# Patient Record
Sex: MALE | Race: WHITE | Employment: FULL TIME | URBAN - METROPOLITAN AREA
[De-identification: names, ages, dates, MRNs, and addresses within clinical notes are randomized per-mention and may not be internally consistent; named-entity substitution may affect disease eponyms.]

---

## 2017-06-01 ENCOUNTER — IP HISTORICAL/CONVERTED ENCOUNTER (OUTPATIENT)
Dept: OTHER | Age: 22
End: 2017-06-01

## 2017-06-16 ENCOUNTER — IP HISTORICAL/CONVERTED ENCOUNTER (OUTPATIENT)
Dept: OTHER | Age: 22
End: 2017-06-16

## 2017-08-17 ENCOUNTER — ED HISTORICAL/CONVERTED ENCOUNTER (OUTPATIENT)
Dept: OTHER | Age: 22
End: 2017-08-17

## 2017-08-20 ENCOUNTER — HOSPITAL ENCOUNTER (EMERGENCY)
Age: 22
Discharge: PSYCHIATRIC OR SUBSTANCE USE FACILITY | End: 2017-08-21
Attending: EMERGENCY MEDICINE
Payer: COMMERCIAL

## 2017-08-20 DIAGNOSIS — R45.851 SUICIDAL IDEATIONS: ICD-10-CM

## 2017-08-20 DIAGNOSIS — F99 MENTAL HEALTH DISORDER: Primary | ICD-10-CM

## 2017-08-20 LAB
AMPHET UR QL SCN: NEGATIVE
APPEARANCE UR: CLEAR
BACTERIA URNS QL MICRO: NEGATIVE /HPF
BARBITURATES UR QL SCN: NEGATIVE
BENZODIAZ UR QL: POSITIVE
BILIRUB UR QL: NEGATIVE
CANNABINOIDS UR QL SCN: POSITIVE
COCAINE UR QL SCN: NEGATIVE
COLOR UR: ABNORMAL
DRUG SCRN COMMENT,DRGCM: ABNORMAL
EPITH CASTS URNS QL MICRO: ABNORMAL /LPF
GLUCOSE UR STRIP.AUTO-MCNC: NEGATIVE MG/DL
HGB UR QL STRIP: NEGATIVE
HYALINE CASTS URNS QL MICRO: ABNORMAL /LPF (ref 0–5)
KETONES UR QL STRIP.AUTO: NEGATIVE MG/DL
LEUKOCYTE ESTERASE UR QL STRIP.AUTO: NEGATIVE
METHADONE UR QL: NEGATIVE
NITRITE UR QL STRIP.AUTO: NEGATIVE
OPIATES UR QL: NEGATIVE
PCP UR QL: NEGATIVE
PH UR STRIP: 6.5 [PH] (ref 5–8)
PROT UR STRIP-MCNC: NEGATIVE MG/DL
RBC #/AREA URNS HPF: ABNORMAL /HPF (ref 0–5)
SP GR UR REFRACTOMETRY: 1.01 (ref 1–1.03)
UROBILINOGEN UR QL STRIP.AUTO: 2 EU/DL (ref 0.2–1)
WBC URNS QL MICRO: ABNORMAL /HPF (ref 0–4)

## 2017-08-20 PROCEDURE — 90791 PSYCH DIAGNOSTIC EVALUATION: CPT

## 2017-08-20 PROCEDURE — 80307 DRUG TEST PRSMV CHEM ANLYZR: CPT | Performed by: EMERGENCY MEDICINE

## 2017-08-20 PROCEDURE — 99285 EMERGENCY DEPT VISIT HI MDM: CPT

## 2017-08-20 PROCEDURE — 81001 URINALYSIS AUTO W/SCOPE: CPT | Performed by: EMERGENCY MEDICINE
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
## 2017-08-20 NOTE — ED TRIAGE NOTES
TRIAGE NOTE: Pt arrives for auditory hallucinations x 1.5 years. Pt has been diagnosed with schizoaffective disorder in the past and is taking medications but hasn't taken them for 1.5 weeks. Reports intermittent thoughts of self harm.

## 2017-08-21 ENCOUNTER — HOSPITAL ENCOUNTER (INPATIENT)
Age: 22
LOS: 4 days | Discharge: HOME | DRG: 885 | End: 2017-08-25
Attending: PSYCHIATRY & NEUROLOGY | Admitting: PSYCHIATRY & NEUROLOGY
Payer: COMMERCIAL

## 2017-08-21 VITALS
DIASTOLIC BLOOD PRESSURE: 71 MMHG | OXYGEN SATURATION: 98 % | HEART RATE: 68 BPM | RESPIRATION RATE: 20 BRPM | BODY MASS INDEX: 32.98 KG/M2 | HEIGHT: 68 IN | WEIGHT: 217.6 LBS | TEMPERATURE: 97.6 F | SYSTOLIC BLOOD PRESSURE: 113 MMHG

## 2017-08-21 PROBLEM — F17.210 DEPENDENCE ON NICOTINE FROM CIGARETTES: Status: ACTIVE | Noted: 2017-08-21

## 2017-08-21 PROBLEM — F25.9 SCHIZOAFFECTIVE DISORDER (HCC): Status: ACTIVE | Noted: 2017-08-21

## 2017-08-21 PROBLEM — F29 PSYCHOSIS (HCC): Status: ACTIVE | Noted: 2017-08-21

## 2017-08-21 PROBLEM — F19.10 POLYSUBSTANCE ABUSE (HCC): Status: ACTIVE | Noted: 2017-08-21

## 2017-08-21 PROBLEM — F20.9 SCHIZOPHRENIA (HCC): Status: ACTIVE | Noted: 2017-08-21

## 2017-08-21 LAB
ALBUMIN SERPL-MCNC: 3.7 G/DL (ref 3.5–5)
ALBUMIN/GLOB SERPL: 1.2 {RATIO} (ref 1.1–2.2)
ALP SERPL-CCNC: 77 U/L (ref 45–117)
ALT SERPL-CCNC: 24 U/L (ref 12–78)
ANION GAP SERPL CALC-SCNC: 10 MMOL/L (ref 5–15)
APAP SERPL-MCNC: <2 UG/ML (ref 10–30)
AST SERPL-CCNC: 10 U/L (ref 15–37)
BASOPHILS # BLD: 0.1 K/UL (ref 0–0.1)
BASOPHILS NFR BLD: 1 % (ref 0–1)
BILIRUB SERPL-MCNC: 0.5 MG/DL (ref 0.2–1)
BUN SERPL-MCNC: 5 MG/DL (ref 6–20)
BUN/CREAT SERPL: 5 (ref 12–20)
CALCIUM SERPL-MCNC: 8.6 MG/DL (ref 8.5–10.1)
CHLORIDE SERPL-SCNC: 103 MMOL/L (ref 97–108)
CO2 SERPL-SCNC: 28 MMOL/L (ref 21–32)
CREAT SERPL-MCNC: 1.02 MG/DL (ref 0.7–1.3)
EOSINOPHIL # BLD: 0.3 K/UL (ref 0–0.4)
EOSINOPHIL NFR BLD: 5 % (ref 0–7)
ERYTHROCYTE [DISTWIDTH] IN BLOOD BY AUTOMATED COUNT: 16.8 % (ref 11.5–14.5)
ETHANOL SERPL-MCNC: <10 MG/DL
GLOBULIN SER CALC-MCNC: 3.2 G/DL (ref 2–4)
GLUCOSE SERPL-MCNC: 93 MG/DL (ref 65–100)
HCT VFR BLD AUTO: 41.7 % (ref 36.6–50.3)
HGB BLD-MCNC: 13.8 G/DL (ref 12.1–17)
LYMPHOCYTES # BLD: 2.9 K/UL (ref 0.8–3.5)
LYMPHOCYTES NFR BLD: 42 % (ref 12–49)
MCH RBC QN AUTO: 29.7 PG (ref 26–34)
MCHC RBC AUTO-ENTMCNC: 33.1 G/DL (ref 30–36.5)
MCV RBC AUTO: 89.7 FL (ref 80–99)
MONOCYTES # BLD: 0.6 K/UL (ref 0–1)
MONOCYTES NFR BLD: 9 % (ref 5–13)
NEUTS SEG # BLD: 3 K/UL (ref 1.8–8)
NEUTS SEG NFR BLD: 43 % (ref 32–75)
PLATELET # BLD AUTO: 294 K/UL (ref 150–400)
POTASSIUM SERPL-SCNC: 3.5 MMOL/L (ref 3.5–5.1)
PROT SERPL-MCNC: 6.9 G/DL (ref 6.4–8.2)
RBC # BLD AUTO: 4.65 M/UL (ref 4.1–5.7)
SALICYLATES SERPL-MCNC: 2.7 MG/DL (ref 2.8–20)
SODIUM SERPL-SCNC: 141 MMOL/L (ref 136–145)
WBC # BLD AUTO: 6.9 K/UL (ref 4.1–11.1)

## 2017-08-21 PROCEDURE — 80307 DRUG TEST PRSMV CHEM ANLYZR: CPT | Performed by: EMERGENCY MEDICINE

## 2017-08-21 PROCEDURE — 74011250637 HC RX REV CODE- 250/637: Performed by: PSYCHIATRY & NEUROLOGY

## 2017-08-21 PROCEDURE — 80053 COMPREHEN METABOLIC PANEL: CPT | Performed by: EMERGENCY MEDICINE

## 2017-08-21 PROCEDURE — 85025 COMPLETE CBC W/AUTO DIFF WBC: CPT | Performed by: EMERGENCY MEDICINE

## 2017-08-21 PROCEDURE — 65220000003 HC RM SEMIPRIVATE PSYCH

## 2017-08-21 PROCEDURE — 36415 COLL VENOUS BLD VENIPUNCTURE: CPT | Performed by: EMERGENCY MEDICINE

## 2017-08-21 RX ORDER — IBUPROFEN 200 MG
1 TABLET ORAL
Status: DISCONTINUED | OUTPATIENT
Start: 2017-08-21 | End: 2017-08-25 | Stop reason: HOSPADM

## 2017-08-21 RX ORDER — LORAZEPAM 1 MG/1
1 TABLET ORAL
Status: DISCONTINUED | OUTPATIENT
Start: 2017-08-21 | End: 2017-08-22

## 2017-08-21 RX ORDER — LORAZEPAM 2 MG/ML
2 INJECTION INTRAMUSCULAR
Status: DISCONTINUED | OUTPATIENT
Start: 2017-08-21 | End: 2017-08-25 | Stop reason: HOSPADM

## 2017-08-21 RX ORDER — IBUPROFEN 400 MG/1
400 TABLET ORAL
Status: DISCONTINUED | OUTPATIENT
Start: 2017-08-21 | End: 2017-08-25 | Stop reason: HOSPADM

## 2017-08-21 RX ORDER — ZOLPIDEM TARTRATE 10 MG/1
10 TABLET ORAL
Status: DISCONTINUED | OUTPATIENT
Start: 2017-08-21 | End: 2017-08-25 | Stop reason: HOSPADM

## 2017-08-21 RX ORDER — OLANZAPINE 20 MG/1
20 TABLET ORAL
COMMUNITY

## 2017-08-21 RX ORDER — ACETAMINOPHEN 325 MG/1
650 TABLET ORAL
Status: DISCONTINUED | OUTPATIENT
Start: 2017-08-21 | End: 2017-08-25 | Stop reason: HOSPADM

## 2017-08-21 RX ORDER — ADHESIVE BANDAGE
30 BANDAGE TOPICAL DAILY PRN
Status: DISCONTINUED | OUTPATIENT
Start: 2017-08-21 | End: 2017-08-25 | Stop reason: HOSPADM

## 2017-08-21 RX ORDER — BENZTROPINE MESYLATE 1 MG/ML
2 INJECTION INTRAMUSCULAR; INTRAVENOUS
Status: DISCONTINUED | OUTPATIENT
Start: 2017-08-21 | End: 2017-08-25 | Stop reason: HOSPADM

## 2017-08-21 RX ORDER — OLANZAPINE 5 MG/1
5 TABLET ORAL
Status: DISCONTINUED | OUTPATIENT
Start: 2017-08-21 | End: 2017-08-25 | Stop reason: HOSPADM

## 2017-08-21 RX ORDER — BENZTROPINE MESYLATE 2 MG/1
2 TABLET ORAL
Status: DISCONTINUED | OUTPATIENT
Start: 2017-08-21 | End: 2017-08-25 | Stop reason: HOSPADM

## 2017-08-21 RX ADMIN — ZOLPIDEM TARTRATE 10 MG: 10 TABLET, FILM COATED ORAL at 21:22

## 2017-08-21 RX ADMIN — LORAZEPAM 1 MG: 1 TABLET ORAL at 14:12

## 2017-08-21 RX ADMIN — IBUPROFEN 400 MG: 400 TABLET, FILM COATED ORAL at 17:40

## 2017-08-21 RX ADMIN — LORAZEPAM 1 MG: 1 TABLET ORAL at 19:08

## 2017-08-21 NOTE — IP AVS SNAPSHOT
Justa Prince 
 
 
 [address] Patient: Liban Mc MRN: [id number] :1995 You are allergic to the following Allergen Reactions Penicillin G Hives Recent Documentation Height Weight BMI Smoking Status 1.702 m 98.5 kg 34.02 kg/m2 Heavy Tobacco Smoker Emergency Contacts Name Discharge Info Relation Home Work Mobile Agusto Nguyen DISCHARGE CAREGIVER [3] Brother [24]   [phone] About your hospitalization You were admitted on:  2017 You last received care in the:  [facility]. [facility] You were discharged on:  2017 Unit phone number:  [phone] Why you were hospitalized Your primary diagnosis was:  Schizophrenia (Hcc) Your diagnoses also included:  Polysubstance Abuse, Psychosis, Dependence On Nicotine From Cigarettes, Antisocial Personality Disorder Providers Seen During Your Hospitalizations Provider Role Specialty Primary office phone Barby Stokes MD Attending Provider Psychiatry [phone] Your Primary Care Physician (PCP) Primary Care Physician Office Phone Office Fax Aquiles Edmondson [phone] Follow-up Information Follow up With Details Comments Contact Info Adonay Quiroga MD   [address] [address] 
[phone] [facility] on 2017 Please go to [place] at 1:00 PM on  for medication management and therapy appoitnments [facility] [employer] [address], [address] [phone]  
 
  
17 Dr Judd Cooney at 1:50 PM 
Therapy appt with Ada Trinh at 1:15 PM   
  
  
 
  
  
  
Current Discharge Medication List  
  
START taking these medications Dose & Instructions Dispensing Information Comments Morning Noon Evening Bedtime buPROPion  mg XL tablet Commonly known as:  Bronwyn Erm  
   
 Your last dose was: Your next dose is:    
   
   
 Dose:  300 mg Take 1 Tab by mouth every morning for 14 days. Indications: major depressive disorder Quantity:  14 Tab Refills:  0 CONTINUE these medications which have CHANGED Dose & Instructions Dispensing Information Comments Morning Noon Evening Bedtime * ZyPREXA 20 mg tablet Generic drug:  OLANZapine What changed:  Another medication with the same name was added. Make sure you understand how and when to take each. Your last dose was: Your next dose is:    
   
   
 Dose:  20 mg Take 20 mg by mouth nightly. Refills:  0  
     
   
   
   
  
 * OLANZapine 20 mg tablet Commonly known as:  ZyPREXA What changed: You were already taking a medication with the same name, and this prescription was added. Make sure you understand how and when to take each. Your last dose was: Your next dose is:    
   
   
 Dose:  20 mg Take 1 Tab by mouth every evening for 14 days. Indications: SCHIZOPHRENIA Quantity:  14 Tab Refills:  0  
     
   
   
   
  
 * Notice: This list has 2 medication(s) that are the same as other medications prescribed for you. Read the directions carefully, and ask your doctor or other care provider to review them with you. Where to Get Your Medications Information on where to get these meds will be given to you by the nurse or doctor. ! Ask your nurse or doctor about these medications buPROPion  mg XL tablet OLANZapine 20 mg tablet Discharge Instructions DISCHARGE SUMMARY from Nurse The following personal items are in your possession at time of discharge: 
 
Dental Appliances: None Visual Aid: None Home Medications: None Jewelry: None Clothing: Shirt Other Valuables: Cell Phone Personal Items Sent to Safe:  (Cell phone sent to safe) PATIENT INSTRUCTIONS: 
 
 
 
 
 What to do at Home: 
Recommended activity: Activity as tolerated, If I feel that I can not keep these promises and I am at risk of hurting myself or others, I will call the crisis office and speak with a crisis worker who will assist me during my crisis. [address]  [phone] [address]   [phone] [facility]  [phone] [facility]  [phone] *  Please give a list of your current medications to your Primary Care Provider. *  Please update this list whenever your medications are discontinued, doses are 
    changed, or new medications (including over-the-counter products) are added. *  Please carry medication information at all times in case of emergency situations. These are general instructions for a healthy lifestyle: No smoking/ No tobacco products/ Avoid exposure to second hand smoke Surgeon General's Warning:  Quitting smoking now greatly reduces serious risk to your health. Obesity, smoking, and sedentary lifestyle greatly increases your risk for illness A healthy diet, regular physical exercise & weight monitoring are important for maintaining a healthy lifestyle Recognize signs and symptoms of STROKE: 
 
F-face looks uneven A-arms unable to move or move unevenly S-speech slurred or non-existent T-time-call 911 as soon as signs and symptoms begin-DO NOT go Back to bed or wait to see if you get better-TIME IS BRAIN. Warning Signs of HEART ATTACK Call 911 if you have these symptoms: 
? Chest discomfort. Most heart attacks involve discomfort in the center of the chest that lasts more than a few minutes, or that goes away and comes back. It can feel like uncomfortable pressure, squeezing, fullness, or pain. ? Discomfort in other areas of the upper body. Symptoms can include pain or discomfort in one or both arms, the back, neck, jaw, or stomach. ? Shortness of breath with or without chest discomfort. ? Other signs may include breaking out in a cold sweat, nausea, or lightheadedness. Don't wait more than five minutes to call [address]! Fast action can save your life. Calling 911 is almost always the fastest way to get lifesaving treatment. Emergency Medical Services staff can begin treatment when they arrive  up to an hour sooner than if someone gets to the hospital by car. The discharge information has been reviewed with the patient. The patient verbalized understanding. Discharge medications reviewed with the patient and appropriate educational materials and side effects teaching were provided. Discharge Orders None BAUNAT Announcement We are excited to announce that we are making your provider's discharge notes available to you in BAUNAT. You will see these notes when they are completed and signed by the physician that discharged you from your recent hospital stay. If you have any questions or concerns about any information you see in BAUNAT, please call the Health Information Department where you were seen or reach out to your Primary Care Provider for more information about your plan of care. Introducing [facility]! Boris Garcia introduces BAUNAT patient portal. Now you can access parts of your medical record, email your doctor's office, and request medication refills online. 1. In your internet browser, go to [email or web address] 2. Click on the First Time User? Click Here link in the Sign In box. You will see the New Member Sign Up page. 3. Enter your BAUNAT Access Code exactly as it appears below. You will not need to use this code after youve completed the sign-up process. If you do not sign up before the expiration date, you must request a new code. · BAUNAT Access Code: [email or web address] Expires: 11/19/2017  4:49 PM 
 
 4. Enter the last four digits of your Social Security Number (xxxx) and Date of Birth (mm/dd/yyyy) as indicated and click Submit. You will be taken to the next sign-up page. 5. Create a [employer] ID. This will be your [employer] login ID and cannot be changed, so think of one that is secure and easy to remember. 6. Create a [employer] password. You can change your password at any time. 7. Enter your Password Reset Question and Answer. This can be used at a later time if you forget your password. 8. Enter your e-mail address. You will receive e-mail notification when new information is available in [address] 9. Click Sign Up. You can now view and download portions of your medical record. 10. Click the Download Summary menu link to download a portable copy of your medical information. If you have questions, please visit the Frequently Asked Questions section of the [employer] website. Remember, [employer] is NOT to be used for urgent needs. For medical emergencies, dial 911. Now available from your iPhone and Android! General Information Please provide this summary of care documentation to your next provider. Patient Signature:  ____________________________________________________________ Date:  ____________________________________________________________  
  
Elizabeth Whitt Provider Signature:  ____________________________________________________________ Date:  ____________________________________________________________

## 2017-08-21 NOTE — IP AVS SNAPSHOT
[facility] 
 
 
 [address] Patient: Bia Reddy MRN: [id number] :1995 Current Discharge Medication List  
  
START taking these medications Dose & Instructions Dispensing Information Comments Morning Noon Evening Bedtime buPROPion  mg XL tablet Commonly known as:  Roosvelt Blazing Your last dose was: Your next dose is:    
   
   
 Dose:  300 mg Take 1 Tab by mouth every morning for 14 days. Indications: major depressive disorder Quantity:  14 Tab Refills:  0 CONTINUE these medications which have CHANGED Dose & Instructions Dispensing Information Comments Morning Noon Evening Bedtime * ZyPREXA 20 mg tablet Generic drug:  OLANZapine What changed:  Another medication with the same name was added. Make sure you understand how and when to take each. Your last dose was: Your next dose is:    
   
   
 Dose:  20 mg Take 20 mg by mouth nightly. Refills:  0  
     
   
   
   
  
 * OLANZapine 20 mg tablet Commonly known as:  ZyPREXA What changed: You were already taking a medication with the same name, and this prescription was added. Make sure you understand how and when to take each. Your last dose was: Your next dose is:    
   
   
 Dose:  20 mg Take 1 Tab by mouth every evening for 14 days. Indications: SCHIZOPHRENIA Quantity:  14 Tab Refills:  0  
     
   
   
   
  
 * Notice: This list has 2 medication(s) that are the same as other medications prescribed for you. Read the directions carefully, and ask your doctor or other care provider to review them with you. Where to Get Your Medications Information on where to get these meds will be given to you by the nurse or doctor. ! Ask your nurse or doctor about these medications buPROPion  mg XL tablet OLANZapine 20 mg tablet

## 2017-08-21 NOTE — BH NOTES
Patient requesting to leave. Dr. Charissa Pedroza notified. Crisis called and notified - stated they will come and evaluate patient.

## 2017-08-21 NOTE — ED NOTES
Arranged transport via [facility] to [address] [place] [address]. ETA is approximately 90 minutes.
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
## 2017-08-21 NOTE — ED PROVIDER NOTES
HPI Comments: 25 y.o. male with past medical history significant for schizoaffective disorder who presents from home with chief complaint of delusions. The pt reports that he has been experiencing delusions such as people are reading his mind and he is the antichrist. The pt has been having these problems for a year and a half and medications have been helping his sx. The pt has not been taking his medications for the last few nights because he keeps forgetting. The pt also reports intermittent SI. The pt denies HI. There are no other acute medical concerns at this time. Social hx: current smoker, occasional EtOH use, no other drug use, works in [employer]  PCP: Blu Corral MD    Note written by Alin Whitley, as dictated by Elana Maravilla MD 8:23 PM      The history is provided by the patient. No  was used. Past Medical History:   Diagnosis Date    Schizoaffective disorder ([facility] [address]        History reviewed. No pertinent surgical history. History reviewed. No pertinent family history. Social History     Social History    Marital status: SINGLE     Spouse name: N/A    Number of children: N/A    Years of education: N/A     Occupational History    Not on file. Social History Main Topics    Smoking status: Heavy Tobacco Smoker     Packs/day: 0.50    Smokeless tobacco: Never Used    Alcohol use No    Drug use: No    Sexual activity: Not on file     Other Topics Concern    Not on file     Social History Narrative    No narrative on file         ALLERGIES: Penicillin g    Review of Systems   Constitutional: Negative for chills, diaphoresis and fever. HENT: Negative for congestion, postnasal drip, rhinorrhea and sore throat. Eyes: Negative for photophobia, discharge, redness and visual disturbance. Respiratory: Negative for cough, chest tightness, shortness of breath and wheezing.     Cardiovascular: Negative for chest pain, palpitations and leg swelling. Gastrointestinal: Negative for abdominal distention, abdominal pain, blood in stool, constipation, diarrhea, nausea and vomiting. Genitourinary: Negative for difficulty urinating, dysuria, frequency, hematuria and urgency. Musculoskeletal: Negative for arthralgias, back pain, joint swelling and myalgias. Skin: Negative for color change and rash. Neurological: Negative for dizziness, speech difficulty, weakness, light-headedness, numbness and headaches. Psychiatric/Behavioral: Positive for suicidal ideas. Negative for confusion. The patient is not nervous/anxious. Delusions; All other systems reviewed and are negative. Vitals:    08/20/17 1744   BP: 110/61   Pulse: (!) 119   Resp: 18   Temp: 98.6 °F (37 °C)   SpO2: 96%   Weight: 98.7 kg (217 lb 9.6 oz)   Height: 5' 8\" (1.727 m)            Physical Exam   Constitutional: He is oriented to person, place, and time. He appears well-developed and well-nourished. No distress. HENT:   Head: Normocephalic and atraumatic. Right Ear: External ear normal.   Left Ear: External ear normal.   Nose: Nose normal.   Mouth/Throat: Oropharynx is clear and moist.   Eyes: Conjunctivae and EOM are normal. Pupils are equal, round, and reactive to light. No scleral icterus. Neck: Normal range of motion. Neck supple. No JVD present. No tracheal deviation present. No thyromegaly present. Cardiovascular: Normal rate, regular rhythm and normal heart sounds. Exam reveals no gallop and no friction rub. No murmur heard. Pulmonary/Chest: Effort normal and breath sounds normal. No respiratory distress. He has no wheezes. He has no rales. He exhibits no tenderness. Abdominal: Soft. Bowel sounds are normal. He exhibits no distension and no mass. There is no tenderness. There is no rebound and no guarding. Musculoskeletal: Normal range of motion. He exhibits no edema or tenderness. Lymphadenopathy:     He has no cervical adenopathy. Neurological: He is alert and oriented to person, place, and time. He has normal strength. He displays no atrophy and no tremor. No cranial nerve deficit. He exhibits normal muscle tone. Coordination and gait normal.   Skin: Skin is warm and dry. No rash noted. He is not diaphoretic. No erythema. Psychiatric: He has a normal mood and affect. His behavior is normal. Judgment and thought content normal.   Nursing note and vitals reviewed. Note written by Alin Beltran, as dictated by Caryle Marines, MD 8:24 PM    MDM  Number of Diagnoses or Management Options  Diagnosis management comments: SMITH  Impression: 46-year-old male presenting to the emergency department with complaints of mental health disorder, states he's been off his medications and is having some suicidal ideations. Plan of care B. psychiatric labs and a behavioral health consultation we'll treat accordingly. ED Course       Procedures      10:35 PM  Pt will be transferred to [facility] accepted by Dr Jayson Iyer who treats him regularly. Await clearance labs. 10:53 PMChange of shift. Care of patient signed over to Dr Yokasta Milton. Handoff complete.

## 2017-08-21 NOTE — BH NOTES
During free time Pt observed pacing hallways flat,sad affect . However,pleasent upon approach. Staff did initiate 1:1 with no new self disclosure. Also staff encourage him to work his program by verbalizing issues , participating in all activities , and compliant with medications . Staff monitor q15min
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
## 2017-08-21 NOTE — ED NOTES
First contact with pt in tx bed 15, assumed care of pt, nad noted. Pt aware of poc. Am waiting for bed assignment.

## 2017-08-21 NOTE — CONSULTS
Medical Consult for [facility] Patient    Consult H&P   dictated, see patient chart    Impression:    Acacia chatterjee 25 y.o. male with past medical history of schizoaffective disease presents with behavioral health problems of delusions admitted for further psychiatric evaluation and treatment. Plan:   1. Psychiatry to manage mental health issues. 2. Medically stable at this time, will follow up as needed. 3. No VTE prophylaxis indicated or necessary at this time.      Thank you  Jr Alfaro MD  8/21/2017, 5:59 PM

## 2017-08-21 NOTE — ED NOTES
PROGRESS NOTE:  11:37 PM   Although blood was drawn and sent by nursing staff, it is not in process so nurses will have to redraw and resend blood to lab.

## 2017-08-21 NOTE — BH NOTES
GROUP THERAPY PROGRESS NOTE    The patient Kirsten chatterjee 25 y.o. male is participating in [facility] [employer]. Group time: 45 minutes    Personal goal for participation:  To participate in mental health journey game    Goal orientation:  personal    Group therapy participation: active    Therapeutic interventions reviewed and discussed: choices in recovery    Impression of participation:  The patient was attentive-required prompting    Davion Ch  8/21/2017  5:26 PM
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
## 2017-08-22 LAB
CHOLEST SERPL-MCNC: 121 MG/DL
GLUCOSE P FAST SERPL-MCNC: 95 MG/DL (ref 65–100)
HDLC SERPL-MCNC: 34 MG/DL
HDLC SERPL: 3.6 {RATIO} (ref 0–5)
LDLC SERPL CALC-MCNC: 67 MG/DL (ref 0–100)
LIPID PROFILE,FLP: NORMAL
TRIGL SERPL-MCNC: 100 MG/DL (ref ?–150)
TSH SERPL DL<=0.05 MIU/L-ACNC: 1.5 UIU/ML (ref 0.36–3.74)
VLDLC SERPL CALC-MCNC: 20 MG/DL

## 2017-08-22 PROCEDURE — 80061 LIPID PANEL: CPT | Performed by: PSYCHIATRY & NEUROLOGY

## 2017-08-22 PROCEDURE — 84443 ASSAY THYROID STIM HORMONE: CPT | Performed by: PSYCHIATRY & NEUROLOGY

## 2017-08-22 PROCEDURE — 82947 ASSAY GLUCOSE BLOOD QUANT: CPT | Performed by: PSYCHIATRY & NEUROLOGY

## 2017-08-22 PROCEDURE — 74011250637 HC RX REV CODE- 250/637: Performed by: PSYCHIATRY & NEUROLOGY

## 2017-08-22 PROCEDURE — 36415 COLL VENOUS BLD VENIPUNCTURE: CPT | Performed by: PSYCHIATRY & NEUROLOGY

## 2017-08-22 PROCEDURE — 65220000003 HC RM SEMIPRIVATE PSYCH

## 2017-08-22 RX ORDER — OLANZAPINE 10 MG/1
20 TABLET ORAL EVERY EVENING
Status: DISCONTINUED | OUTPATIENT
Start: 2017-08-22 | End: 2017-08-22

## 2017-08-22 RX ORDER — BUPROPION HYDROCHLORIDE 150 MG/1
300 TABLET ORAL
Status: DISCONTINUED | OUTPATIENT
Start: 2017-08-22 | End: 2017-08-25 | Stop reason: HOSPADM

## 2017-08-22 RX ORDER — LORAZEPAM 1 MG/1
1 TABLET ORAL
Status: DISCONTINUED | OUTPATIENT
Start: 2017-08-22 | End: 2017-08-25 | Stop reason: HOSPADM

## 2017-08-22 RX ORDER — OLANZAPINE 10 MG/1
20 TABLET ORAL EVERY EVENING
Status: DISCONTINUED | OUTPATIENT
Start: 2017-08-22 | End: 2017-08-25 | Stop reason: HOSPADM

## 2017-08-22 RX ORDER — HYDROXYZINE PAMOATE 25 MG/1
50 CAPSULE ORAL
Status: DISCONTINUED | OUTPATIENT
Start: 2017-08-22 | End: 2017-08-25 | Stop reason: HOSPADM

## 2017-08-22 RX ADMIN — OLANZAPINE 20 MG: 10 TABLET, FILM COATED ORAL at 19:08

## 2017-08-22 RX ADMIN — LORAZEPAM 1 MG: 1 TABLET ORAL at 19:08

## 2017-08-22 RX ADMIN — BUPROPION HYDROCHLORIDE 300 MG: 150 TABLET, FILM COATED, EXTENDED RELEASE ORAL at 10:00

## 2017-08-22 RX ADMIN — LORAZEPAM 1 MG: 1 TABLET ORAL at 14:39

## 2017-08-22 NOTE — PROGRESS NOTES
Problem: Psychosis  Goal: *STG: Remains safe in hospital  Outcome: Progressing Towards Goal  Patient is alert and oriented, appears irritable because he did not go home today, and will probably lose his job. Patient is medication and meal compliant, no behavior management issues. Little interaction with peers or staff, did not attend group today. Staff will continue safety checks.

## 2017-08-22 NOTE — PROGRESS NOTES
Laboratory monitoring for mood stabilizer and antipsychotics:    Recommended baseline monitoring has been completed based on this patient's current medication regimen. The patient is currently taking the following medication(s):   Current Facility-Administered Medications   Medication Dose Route Frequency    buPROPion XL (WELLBUTRIN XL) tablet 300 mg  300 mg Oral 7am    OLANZapine (ZyPREXA) tablet 20 mg  20 mg Oral QPM       Height, Weight, BMI Estimation  Estimated body mass index is 34.02 kg/(m^2) as calculated from the following:    Height as of this encounter: 170.2 cm (67\"). Weight as of this encounter: 98.5 kg (217 lb 3.2 oz). Renal Function, Hepatic Function and Chemistry  Estimated Creatinine Clearance: 128.2 mL/min (based on Cr of 1.02). Lab Results   Component Value Date/Time    Sodium 141 08/21/2017 12:07 AM    Potassium 3.5 08/21/2017 12:07 AM    Chloride 103 08/21/2017 12:07 AM    CO2 28 08/21/2017 12:07 AM    Anion gap 10 08/21/2017 12:07 AM    Glucose 95 08/22/2017 05:04 AM    BUN 5 08/21/2017 12:07 AM    Creatinine 1.02 08/21/2017 12:07 AM    BUN/Creatinine ratio 5 08/21/2017 12:07 AM    GFR est AA >60 08/21/2017 12:07 AM    GFR est non-AA >60 08/21/2017 12:07 AM    Calcium 8.6 08/21/2017 12:07 AM    ALT (SGPT) 24 08/21/2017 12:07 AM    AST (SGOT) 10 08/21/2017 12:07 AM    Alk.  phosphatase 77 08/21/2017 12:07 AM    Protein, total 6.9 08/21/2017 12:07 AM    Albumin 3.7 08/21/2017 12:07 AM    Globulin 3.2 08/21/2017 12:07 AM    A-G Ratio 1.2 08/21/2017 12:07 AM    Bilirubin, total 0.5 08/21/2017 12:07 AM       Lab Results   Component Value Date/Time    Glucose 95 08/22/2017 05:04 AM     Hematology  Lab Results   Component Value Date/Time    WBC 6.9 08/21/2017 12:07 AM    HGB 13.8 08/21/2017 12:07 AM    HCT 41.7 08/21/2017 12:07 AM    PLATELET 961 62/77/7381 12:07 AM    MCV 89.7 08/21/2017 12:07 AM       Lipids  Lab Results   Component Value Date/Time    Cholesterol, total 121 08/22/2017 05:04 AM    HDL Cholesterol 34 08/22/2017 05:04 AM    LDL, calculated 67 08/22/2017 05:04 AM    Triglyceride 100 08/22/2017 05:04 AM    CHOL/HDL Ratio 3.6 08/22/2017 05:04 AM       Thyroid Function    Lab Results   Component Value Date/Time    TSH 1.50 08/22/2017 05:04 AM     Vitals  Visit Vitals    /74    Pulse 82    Temp 97.5 °F (36.4 °C)    Resp 16    Ht 170.2 cm (67\")    Wt 98.5 kg (217 lb 3.2 oz)    SpO2 98%    BMI 34.02 kg/m2       Luciano Govea, PharmD, BCPS  [phone] (pharmacy)

## 2017-08-22 NOTE — BH NOTES
Pt request PRN ativan PO for anxiety @ 1900       Pt requested \"something for sleep\", Ambien given @2122. Will continue to monitor.

## 2017-08-22 NOTE — BH NOTES
GROUP THERAPY PROGRESS NOTE    The patient Erasto Player a 25 y.o. male is participating in Ref[employer] Group    Group time: 30 minutes    Personal goal for participation: To discuss the daily goals    Goal orientation: personal    Group therapy participation: passive    Therapeutic interventions reviewed and discussed:  Yes    Impression of participation: aniya Taveras  8/21/2017  8:53 PM

## 2017-08-22 NOTE — BH NOTES
TRANSFER - OUT REPORT:    Verbal report given to A Young RN on [facility]  being transferred to general unit) for routine progression of care       Report consisted of patients Situation, Background, Assessment and   Recommendations(SB AR). Information from the following report(s) SBAR, Kardex and MAR was reviewed with the receiving nurse. Lines:       Opportunity for questions and clarification was provided.       Patient transported with:   Registered Nurse

## 2017-08-22 NOTE — BH NOTES
While seeing another pt on the General unit, client came up to me asking for his meds. Told him it was not med pass time and he said no, his was a prn and the 4 hrs were up since he got it last.  Let me know that it was Ativan. Suggested he go write in his booklet and save the Ativan to help him relax and sleep. Client walked back up withing 30 min stating he was in such difficulty with his anxiety he needed that med now.   Med given at 7pm.

## 2017-08-22 NOTE — BH NOTES
GROUP THERAPY PROGRESS NOTE    The patient Collette chatterjee 25 y.o. male is participating in [employer]. Group time: 45 minutes    Personal goal for participation: To participate in happiness game    Goal orientation:  personal    Group therapy participation: active    Therapeutic interventions reviewed and discussed: life scenarios exploring the pursuit of happiness    Impression of participation:  The patient was attentive.     Bela Urbano  8/22/2017  1:07 PM

## 2017-08-22 NOTE — CONSULTS
[facility] Samantha Bhatti, [address]   [address] [facility]       Name:  Hawa Draper   MR#:  [id number]   :  1995   Account #:  [de-identified]    Date of Consultation:  2017   Date of Adm:  2017       REFERRING PHYSICIAN: Angelica Blevins MD    REASON FOR CONSULTATION: Medical evaluation for psychiatric   admission. CHIEF COMPLAINT: Delusions. HISTORY OF PRESENT ILLNESS: A 69-year-old who presents   delusional, requiring further psychiatric evaluation and treatment. Denies any chest pain, shortness of breath, nausea, vomiting,   diarrhea. PCP is Dr. Godwin Zacarias MD.     PAST MEDICAL HISTORY: Mental health disease including   schizoaffective disorder. PAST SURGICAL HISTORY: None. ALLERGIES: PENICILLIN G.    MEDICATIONS: Zyprexa. SOCIAL HISTORY: Does smoke cigarettes. Drinks alcohol mainly on   the weekends, 2-3 beers a day, but does not drink every day. Denies   any illicit drug use. Single, has no kids. Works for [employer]. PHYSICAL EXAMINATION   VITAL SIGNS: Temperature is 98.6, blood pressure 110/61, pulse 119,   respirations 18, height 5 feet 8 inches, weight 217, and pulse oximetry   96%. GENERAL: Pleasant, no distress. HEENT: Oropharynx is clear. NECK: Supple. No lymphadenopathy. LUNGS: Clear to auscultation. No wheezes, rales or rhonchi. CARDIOVASCULAR: Regular rhythm and rate. No murmurs, gallops,   or rubs. ABDOMEN: Soft, nontender, nondistended, normoactive bowel   sounds. No hepatosplenomegaly. EXTREMITIES: No cyanosis, clubbing, or edema. LABORATORY DATA: Hemoglobin is 13.8, hematocrit is 41.7. UA was   negative. Sodium 141, potassium 3.5, chloride 103, bicarbonate 28,   BUN is 5, creatinine 1.02, glucose 90. Acetaminophen less than. 2.   Salicylate is 2.7. Tox screen is positive for benzodiazepines and THC. Alcohol less than 10.     IMPRESSION: This is a 69-year-old male with past medical history of mental health disease, presents with delusions, admitted for further   psychiatric evaluation and treatment. PLAN:   1. Psychiatry management of mental health issues. 2. Medically stable. Follow up on a p.r.n. basis. 3. No VTE prophylaxis indicated or warranted at this time. Thank you for this consult.         Zena Bowens MD DC / Gordon.John   D:  08/22/2017   07:15   T:  08/22/2017   08:21   Job #:  [id number]

## 2017-08-22 NOTE — PROGRESS NOTES
Problem: Psychosis  Goal: *STG: Remains safe in hospital  Outcome: Progressing Towards Goal  Patient alert and oriented to person, place and time. Calm and cooperative. Medication and meal compliant. Denies current suicidal or homicidal ideations. Will continue to monitor patient status per [facility] protocol.

## 2017-08-23 PROCEDURE — 74011250637 HC RX REV CODE- 250/637: Performed by: PSYCHIATRY & NEUROLOGY

## 2017-08-23 PROCEDURE — 65220000003 HC RM SEMIPRIVATE PSYCH

## 2017-08-23 RX ADMIN — HYDROXYZINE PAMOATE 50 MG: 25 CAPSULE ORAL at 10:02

## 2017-08-23 RX ADMIN — ZOLPIDEM TARTRATE 10 MG: 10 TABLET, FILM COATED ORAL at 21:09

## 2017-08-23 RX ADMIN — OLANZAPINE 20 MG: 10 TABLET, FILM COATED ORAL at 20:03

## 2017-08-23 RX ADMIN — BUPROPION HYDROCHLORIDE 300 MG: 150 TABLET, FILM COATED, EXTENDED RELEASE ORAL at 08:46

## 2017-08-23 NOTE — BH NOTES
GROUP THERAPY PROGRESS NOTE    Carlie Zacarias is participating in [place].      Group time: 30 minutes    Personal goal for participation: daily orientation    Goal orientation: personal    Group therapy participation: active    Therapeutic interventions reviewed and discussed: yes    Impression of participation: cooperative

## 2017-08-23 NOTE — BH NOTES
The patient is very focused on being discharged. He came to team and the doctor and the physician explained to the patient that they will need to have a solid plan in place for him before discharge and the  will be working to find services for him. the patient is constantly asking the same questions about his discharge. Safety checks Q 15 minutes.

## 2017-08-23 NOTE — BH NOTES
PSYCHIATRIC PROGRESS NOTE         Patient Name  Collette Zimmerman   Date of Birth 1995   [facility] [id number]   Medical Record Number  [id number]      Age  25 y.o. PCP Adi Lopez MD   Admit date:  8/21/2017    Room Number  327/01  @ [facility]   Date of Service  8/23/2017          PSYCHOTHERAPY SESSION NOTE:  Length of psychotherapy session: 15 minutes    Main condition/diagnosis/issues treated during session today, 8/23/2017 : psychosis, substance use    I employed Cognitive Behavioral therapy techniques, Reality-Oriented psychotherapy, as well as supportive psychotherapy in regards to various ongoing psychosocial stressors, including the following: pre-admission and current problems; housing issues; occupational issues; academic issues; legal issues; medical issues; and stress of hospitalization. Interpersonal relationship issues and psychodynamic conflicts explored. Attempts made to alleviate maladaptive patterns. We, also, worked on issues of denial & effects of substance dependency/use     Overall, patient is slowly progressing    Treatment Plan Update (reviewed an updated 8/23/2017) : I will modify psychotherapy tx plan by implementing more stress management strategies, building upon cognitive behavioral techniques, increasing coping skills, as well as shoring up psychological defenses). An extended energy and skill set was needed to engage pt in psychotherapy due to some of the following: resistiveness, complexity, negativity, confrontational nature, hostile behaviors, and/or severe abnormalities in thought processes/psychosis resulting in the loss of expressive/receptive language communication skills. E & M PROGRESS NOTE:         HISTORY       CC:  \"want to go back to work\"  HISTORY OF PRESENT ILLNESS/INTERVAL HISTORY:  (reviewed/updated 8/23/2017). per initial evaluation: The patient, Collette Zimmerman, is a 25 y.o.   WHITE [place]  male with a past psychiatric history significant for schizoaffective disorder, who presents at this time with complaints of (and/or evidence of) the following emotional symptoms: psychotic behavior. Additional symptomatology include alcohol THC use, delusion of demons, antichrist and 7 deadly sin\", thought alienation- people can his mind, poor sleep and non compliance with med . The above symptoms have been present for few days. These symptoms are of severe severity. These symptoms are constant in nature. The patient's condition has been precipitated by and psychosocial stressors (substance abuse ). Patient's condition made worse by continued illicit drug use and alcohol use as well as treatment noncompliance. UDS: +MJ, Benzo; BAL=0.       Trinidad Sheets presents/reports/evidences the following emotional symptoms today, 8/23/2017:psychotic behavior. The above symptoms have been present for few days. These symptoms are of severe severity. The symptoms are intermittent/ fleeting in nature. Additional symptomatology and features include anxiety, preoccupied with dc. Med seeking. Guarded but denies delusional themes. Denies SI/HI/AVH. No w/d sx- poor insight      SIDE EFFECTS: (reviewed/updated 8/23/2017)  None reported or admitted to. No noted toxicity with use of    ALLERGIES:(reviewed/updated 8/23/2017)  Allergies   Allergen Reactions    Penicillin G Hives      MEDICATIONS PRIOR TO ADMISSION:(reviewed/updated 8/23/2017)  Prescriptions Prior to Admission   Medication Sig    OLANZapine (ZYPREXA) 20 mg tablet Take 20 mg by mouth nightly. PAST MEDICAL HISTORY: Past medical history from the initial psychiatric evaluation has been reviewed (reviewed/updated 8/23/2017) with no additional updates (I asked patient and no additional past medical history provided). Past Medical History:   Diagnosis Date    Schizoaffective disorder ([facility] [address]    No past surgical history on file.    SOCIAL HISTORY: Social history from the initial psychiatric evaluation has been reviewed (reviewed/updated 8/23/2017) with no additional updates (I asked patient and no additional social history provided). Social History     Social History    Marital status: SINGLE     Spouse name: N/A    Number of children: N/A    Years of education: N/A     Occupational History    Not on file. Social History Main Topics    Smoking status: Heavy Tobacco Smoker     Packs/day: 2.00    Smokeless tobacco: Never Used    Alcohol use Yes      Comment: occasionally    Drug use: Yes     Special: Marijuana, Benzodiazepines      Comment: sig cocaine use in the past. recently dc from [address] [place] [address]- was removed from half way house due to [facility] use    Sexual activity: Not on file     Other Topics Concern    Not on file     Social History Narrative    Single    No legal charges pending. FAMILY HISTORY: Family history from the initial psychiatric evaluation has been reviewed (reviewed/updated 8/23/2017) with no additional updates (I asked patient and no additional family history provided). Family History   Problem Relation Age of Onset    Schizophrenia Mother        REVIEW OF SYSTEMS: (reviewed/updated 8/23/2017)  Appetite:good   Sleep: fitful   All other Review of Systems: Psychological ROS: positive for - anxiety  Respiratory ROS: no cough, shortness of breath, or wheezing  Cardiovascular ROS: no chest pain or dyspnea on exertion  Neurological ROS: no TIA or stroke symptoms         [facility] (MSE):    MSE FINDINGS ARE WITHIN NORMAL LIMITS (WNL) UNLESS OTHERWISE STATED BELOW. ( ALL OF THE BELOW CATEGORIES OF THE MSE HAVE BEEN REVIEWED (reviewed 8/23/2017) AND UPDATED AS DEEMED APPROPRIATE )  General Presentation age appropriate and disheveled, guarded, uncooperative and unreliable   Orientation oriented to time, place and person   Vital Signs  See below (reviewed 8/23/2017);  Vital Signs (BP, Pulse, & Temp) are within normal limits if not listed below. Gait and Station Stable/steady, no ataxia   Musculoskeletal System No extrapyramidal symptoms (EPS); no abnormal muscular movements or Tardive Dyskinesia (TD); muscle strength and tone are within normal limits   Language No aphasia or dysarthria   Speech:  normal pitch   Thought Processes logical; normal rate of thoughts; fair abstract reasoning/computation   Thought Associations goal directed   Thought Content Less intense delusion, free of martinez   Suicidal Ideations none   Homicidal Ideations none   Mood:  irritable   Affect:  anxious, increased in intensity and irritable   Memory recent  intact   Memory remote:  intact   Concentration/Attention:  distractable   Fund of Knowledge average   Insight:  limited   Reliability untruthful   Judgment:  poor          VITALS:     Patient Vitals for the past 24 hrs:   Temp Pulse Resp BP   08/23/17 0656 96.3 °F (35.7 °C) 73 16 118/81   08/22/17 2048 97.1 °F (36.2 °C) 93 20 127/73   08/22/17 1721 97.2 °F (36.2 °C) 69 18 139/83     Wt Readings from Last 3 Encounters:   08/21/17 98.5 kg (217 lb 3.2 oz)   08/20/17 98.7 kg (217 lb 9.6 oz)     Temp Readings from Last 3 Encounters:   08/23/17 96.3 °F (35.7 °C)   08/21/17 97.6 °F (36.4 °C)     BP Readings from Last 3 Encounters:   08/23/17 118/81   08/21/17 113/71     Pulse Readings from Last 3 Encounters:   08/23/17 73   08/21/17 68            DATA     LABORATORY DATA:(reviewed/updated 8/23/2017)  No results found for this or any previous visit (from the past 24 hour(s)). No results found for: VALF2, VALAC, VALP, VALPR, DS6, CRBAM, CRBAMP, CARB2, XCRBAM  No results found for: LITHM   RADIOLOGY REPORTS:(reviewed/updated 8/23/2017)  No results found.        MEDICATIONS     ALL MEDICATIONS:   Current Facility-Administered Medications   Medication Dose Route Frequency    buPROPion XL (WELLBUTRIN XL) tablet 300 mg  300 mg Oral 7am    OLANZapine (ZyPREXA) tablet 20 mg  20 mg Oral QPM    LORazepam (ATIVAN) tablet 1 mg 1 mg Oral Q12H PRN    hydrOXYzine pamoate (VISTARIL) capsule 50 mg  50 mg Oral QID PRN    ziprasidone (GEODON) 20 mg in sterile water (preservative free) 1 mL injection  20 mg IntraMUSCular BID PRN    OLANZapine (ZyPREXA) tablet 5 mg  5 mg Oral Q6H PRN    benztropine (COGENTIN) tablet 2 mg  2 mg Oral BID PRN    benztropine (COGENTIN) injection 2 mg  2 mg IntraMUSCular BID PRN    LORazepam (ATIVAN) injection 2 mg  2 mg IntraMUSCular Q4H PRN    zolpidem (AMBIEN) tablet 10 mg  10 mg Oral QHS PRN    acetaminophen (TYLENOL) tablet 650 mg  650 mg Oral Q4H PRN    ibuprofen (MOTRIN) tablet 400 mg  400 mg Oral Q8H PRN    magnesium hydroxide (MILK OF MAGNESIA) 400 mg/5 mL oral suspension 30 mL  30 mL Oral DAILY PRN    nicotine (NICODERM CQ) 21 mg/24 hr patch 1 Patch  1 Patch TransDERmal DAILY PRN      SCHEDULED MEDICATIONS:   Current Facility-Administered Medications   Medication Dose Route Frequency    buPROPion XL (WELLBUTRIN XL) tablet 300 mg  300 mg Oral 7am    OLANZapine (ZyPREXA) tablet 20 mg  20 mg Oral QPM          ASSESSMENT & PLAN     DIAGNOSES REQUIRING ACTIVE TREATMENT AND MONITORING: (reviewed/updated 8/23/2017)  Patient Active Hospital Problem List:   Schizophrenia ([facility] [address] (8/21/2017) vs schizoaffective disorder depressive type ( likely) vs substance induced psychotic disorder    Assessment: acute on chronic-denies delusion ( able to mask as the condition improves), preoccupied with dc. Guarded and untruthful. Pt is homeless and high risk of relapse if he were to leave.     Plan: will ct to adjust med- Wellbutrin and Zyprexa     Polysubstance abuse (8/21/2017)    Assessment: THC,alcohol and cocaine per hx- relapsed- was sober for 1.5 month after rehab at [facility]- removed from half way house week ago due to [facility]- low motivation, limited insight- med seeking    Plan: educate, recommend Inpatient rehab, limit use of BENZO-prn vistaril      Dependence on nicotine from cigarettes (8/21/2017) Assessment: sig use    Plan: offered nicotine patch    Personality disorder- antisocial traits- limit setting      In summary, Izabel Krishnamurthy, is a 25 y.o.  male who presents with a severe exacerbation of the principal diagnosis of Schizophrenia ([address]  Patient's condition is worsening/not improving/not stable . Patient requires continued inpatient hospitalization for further stabilization, safety monitoring and medication management. I will continue to coordinate the provision of individual, milieu, occupational, group, and substance abuse therapies to address target symptoms/diagnoses as deemed appropriate for the individual patient. A coordinated, multidisplinary treatment team round was conducted with the patient (this team consists of the nurse, psychiatric unit pharmcist,  and writer). Complete current electronic health record for patient has been reviewed today including consultant notes, ancillary staff notes, nurses and psychiatric tech notes. Suicide risk assessment completed and patient deemed to be of low risk for suicide at this time. The following regarding medications was addressed during rounds with patient:   the risks and benefits of the proposed medication. The patient was given the opportunity to ask questions. Informed consent given to the use of the above medications. Will continue to adjust psychiatric and non-psychiatric medications (see above \"medication\" section and orders section for details) as deemed appropriate & based upon diagnoses and response to treatment. I will continue to order blood tests/labs and diagnostic tests as deemed appropriate and review results as they become available (see orders for details and above listed lab/test results). I will order psychiatric records from previous [facility] hospitals to further elucidate the nature of patient's psychopathology and review once available.     I will gather additional collateral information from friends, family and o/p treatment team to further elucidate the nature of patient's psychopathology and baselline level of psychiatric functioning. I certify that this patient's inpatient psychiatric hospital services furnished since the previous certification were, and continue to be, required for treatment that could reasonably be expected to improve the patient's condition, or for diagnostic study, and that the patient continues to need, on a daily basis, active treatment furnished directly by or requiring the supervision of inpatient psychiatric facility personnel. In addition, the hospital records show that services furnished were intensive treatment services, admission or related services, or equivalent services.     EXPECTED DISCHARGE DATE/DAY: TBD     DISPOSITION: Home       Signed By:   Rajinder Mike MD  8/23/2017

## 2017-08-23 NOTE — BH NOTES
GROUP THERAPY PROGRESS NOTE    The patient Amos Fowler a 25 y.o. male is participating in [employer]. Group time: 1 hour    Personal goal for participation:  To concentrate on selected task    Goal orientation: social    Group therapy participation: active    Therapeutic interventions reviewed and discussed: Crafts, games, music    Impression of participation: The patient was attentive-required prompting    Deanna Gillette  8/23/2017  5:20 PM

## 2017-08-23 NOTE — PROGRESS NOTES
Problem: Psychosis  Goal: *STG: Remains safe in hospital  Outcome: Progressing Towards Goal  Patient is alert and oriented, working on calling around to find places to go to for treatment. Wanting Zyprexa early, however patient has isolated in room except when on phone  or eating his meals. Mood labile rude when speaking with  and sarcastic with staff. Staff will continue safety checks  Q 15 minutes.

## 2017-08-24 PROBLEM — F60.2 ANTISOCIAL PERSONALITY DISORDER (HCC): Status: ACTIVE | Noted: 2017-08-24

## 2017-08-24 PROCEDURE — 74011250637 HC RX REV CODE- 250/637: Performed by: PSYCHIATRY & NEUROLOGY

## 2017-08-24 PROCEDURE — 65220000003 HC RM SEMIPRIVATE PSYCH

## 2017-08-24 RX ORDER — OLANZAPINE 20 MG/1
20 TABLET ORAL EVERY EVENING
Qty: 14 TAB | Refills: 0 | Status: SHIPPED | OUTPATIENT
Start: 2017-08-24 | End: 2017-09-07

## 2017-08-24 RX ORDER — BUPROPION HYDROCHLORIDE 300 MG/1
300 TABLET ORAL
Qty: 14 TAB | Refills: 0 | Status: SHIPPED | OUTPATIENT
Start: 2017-08-24 | End: 2017-09-07

## 2017-08-24 RX ADMIN — HYDROXYZINE PAMOATE 50 MG: 25 CAPSULE ORAL at 09:04

## 2017-08-24 RX ADMIN — HYDROXYZINE PAMOATE 50 MG: 25 CAPSULE ORAL at 17:25

## 2017-08-24 RX ADMIN — BUPROPION HYDROCHLORIDE 300 MG: 150 TABLET, FILM COATED, EXTENDED RELEASE ORAL at 09:04

## 2017-08-24 RX ADMIN — OLANZAPINE 20 MG: 10 TABLET, FILM COATED ORAL at 20:18

## 2017-08-24 NOTE — BH NOTES
PSYCHIATRIC PROGRESS NOTE         Patient Name  Arpit Cruz   Date of Birth 1995   [facility] [id number]   Medical Record Number  [id number]      Age  25 y.o. PCP Carla Kan MD   Admit date:  8/21/2017    Room Number  327/02  @ [facility]   Date of Service  8/24/2017          PSYCHOTHERAPY SESSION NOTE:  Length of psychotherapy session: 15 minutes    Main condition/diagnosis/issues treated during session today, 8/24/2017 : psychosis, substance use    I employed Cognitive Behavioral therapy techniques, Reality-Oriented psychotherapy, as well as supportive psychotherapy in regards to various ongoing psychosocial stressors, including the following: pre-admission and current problems; housing issues; occupational issues; academic issues; legal issues; medical issues; and stress of hospitalization. Interpersonal relationship issues and psychodynamic conflicts explored. Attempts made to alleviate maladaptive patterns. We, also, worked on issues of denial & effects of substance dependency/use     Overall, patient is slowly progressing    Treatment Plan Update (reviewed an updated 8/24/2017) : I will modify psychotherapy tx plan by implementing more stress management strategies, building upon cognitive behavioral techniques, increasing coping skills, as well as shoring up psychological defenses). An extended energy and skill set was needed to engage pt in psychotherapy due to some of the following: resistiveness, complexity, negativity, confrontational nature, hostile behaviors, and/or severe abnormalities in thought processes/psychosis resulting in the loss of expressive/receptive language communication skills. E & M PROGRESS NOTE:         HISTORY       CC:  \"want to go back to work\"  HISTORY OF PRESENT ILLNESS/INTERVAL HISTORY:  (reviewed/updated 8/24/2017). per initial evaluation: The patient, Arpit Cruz, is a 25 y.o.   WHITE [place]  male with a past psychiatric history significant for schizoaffective disorder, who presents at this time with complaints of (and/or evidence of) the following emotional symptoms: psychotic behavior. Additional symptomatology include alcohol THC use, delusion of demons, antichrist and 7 deadly sin\", thought alienation- people can his mind, poor sleep and non compliance with med . The above symptoms have been present for few days. These symptoms are of severe severity. These symptoms are constant in nature. The patient's condition has been precipitated by and psychosocial stressors (substance abuse ). Patient's condition made worse by continued illicit drug use and alcohol use as well as treatment noncompliance. UDS: +MJ, Benzo; BAL=0.       Arpit Nancy presents/reports/evidences the following emotional symptoms today, 8/24/2017:psychotic behavior. The above symptoms have been present for few days. These symptoms are of severe severity. The symptoms are intermittent/ fleeting in nature. Additional symptomatology and features include anxiety, preoccupied with dc, poor insight, unrealistic expectation. Guarded but denies delusion. SIDE EFFECTS: (reviewed/updated 8/24/2017)  None reported or admitted to. No noted toxicity with use of    ALLERGIES:(reviewed/updated 8/24/2017)  Allergies   Allergen Reactions    Penicillin G Hives      MEDICATIONS PRIOR TO ADMISSION:(reviewed/updated 8/24/2017)  Prescriptions Prior to Admission   Medication Sig    OLANZapine (ZYPREXA) 20 mg tablet Take 20 mg by mouth nightly. PAST MEDICAL HISTORY: Past medical history from the initial psychiatric evaluation has been reviewed (reviewed/updated 8/24/2017) with no additional updates (I asked patient and no additional past medical history provided). Past Medical History:   Diagnosis Date    Schizoaffective disorder ([facility] [address]    No past surgical history on file.    SOCIAL HISTORY: Social history from the initial psychiatric evaluation has been reviewed (reviewed/updated 8/24/2017) with no additional updates (I asked patient and no additional social history provided). Social History     Social History    Marital status: SINGLE     Spouse name: N/A    Number of children: N/A    Years of education: N/A     Occupational History    Not on file. Social History Main Topics    Smoking status: Heavy Tobacco Smoker     Packs/day: 2.00    Smokeless tobacco: Never Used    Alcohol use Yes      Comment: occasionally    Drug use: Yes     Special: Marijuana, Benzodiazepines      Comment: sig cocaine use in the past. recently dc from [address] [place] [address]- was removed from half way house due to [facility] use    Sexual activity: Not on file     Other Topics Concern    Not on file     Social History Narrative    Single    No legal charges pending. FAMILY HISTORY: Family history from the initial psychiatric evaluation has been reviewed (reviewed/updated 8/24/2017) with no additional updates (I asked patient and no additional family history provided). Family History   Problem Relation Age of Onset    Schizophrenia Mother        REVIEW OF SYSTEMS: (reviewed/updated 8/24/2017)  Appetite:good   Sleep: fitful   All other Review of Systems: Psychological ROS: positive for - anxiety  Respiratory ROS: no cough, shortness of breath, or wheezing  Cardiovascular ROS: no chest pain or dyspnea on exertion  Neurological ROS: no TIA or stroke symptoms         [facility] (MSE):    MSE FINDINGS ARE WITHIN NORMAL LIMITS (WNL) UNLESS OTHERWISE STATED BELOW. ( ALL OF THE BELOW CATEGORIES OF THE MSE HAVE BEEN REVIEWED (reviewed 8/24/2017) AND UPDATED AS DEEMED APPROPRIATE )  General Presentation age appropriate and disheveled, guarded, uncooperative and unreliable   Orientation oriented to time, place and person   Vital Signs  See below (reviewed 8/24/2017); Vital Signs (BP, Pulse, & Temp) are within normal limits if not listed below. Gait and Station Stable/steady, no ataxia   Musculoskeletal System No extrapyramidal symptoms (EPS); no abnormal muscular movements or Tardive Dyskinesia (TD); muscle strength and tone are within normal limits   Language No aphasia or dysarthria   Speech:  normal pitch   Thought Processes logical; normal rate of thoughts; fair abstract reasoning/computation   Thought Associations goal directed   Thought Content Less intense delusion, free of martinez   Suicidal Ideations none   Homicidal Ideations none   Mood:  irritable   Affect:  anxious, increased in intensity and irritable   Memory recent  intact   Memory remote:  intact   Concentration/Attention:  distractable   Fund of Knowledge average   Insight:  limited   Reliability untruthful   Judgment:  poor          VITALS:     Patient Vitals for the past 24 hrs:   Temp Pulse Resp BP   08/24/17 0714 96.4 °F (35.8 °C) 74 16 119/62     Wt Readings from Last 3 Encounters:   08/21/17 98.5 kg (217 lb 3.2 oz)   08/20/17 98.7 kg (217 lb 9.6 oz)     Temp Readings from Last 3 Encounters:   08/24/17 96.4 °F (35.8 °C)   08/21/17 97.6 °F (36.4 °C)     BP Readings from Last 3 Encounters:   08/24/17 119/62   08/21/17 113/71     Pulse Readings from Last 3 Encounters:   08/24/17 74   08/21/17 68            DATA     LABORATORY DATA:(reviewed/updated 8/24/2017)  No results found for this or any previous visit (from the past 24 hour(s)). No results found for: VALF2, VALAC, VALP, VALPR, DS6, CRBAM, CRBAMP, CARB2, XCRBAM  No results found for: LITHM   RADIOLOGY REPORTS:(reviewed/updated 8/24/2017)  No results found.        MEDICATIONS     ALL MEDICATIONS:   Current Facility-Administered Medications   Medication Dose Route Frequency    buPROPion XL (WELLBUTRIN XL) tablet 300 mg  300 mg Oral 7am    OLANZapine (ZyPREXA) tablet 20 mg  20 mg Oral QPM    LORazepam (ATIVAN) tablet 1 mg  1 mg Oral Q12H PRN    hydrOXYzine pamoate (VISTARIL) capsule 50 mg  50 mg Oral QID PRN    ziprasidone (GEODON) 20 mg in sterile water (preservative free) 1 mL injection  20 mg IntraMUSCular BID PRN    OLANZapine (ZyPREXA) tablet 5 mg  5 mg Oral Q6H PRN    benztropine (COGENTIN) tablet 2 mg  2 mg Oral BID PRN    benztropine (COGENTIN) injection 2 mg  2 mg IntraMUSCular BID PRN    LORazepam (ATIVAN) injection 2 mg  2 mg IntraMUSCular Q4H PRN    zolpidem (AMBIEN) tablet 10 mg  10 mg Oral QHS PRN    acetaminophen (TYLENOL) tablet 650 mg  650 mg Oral Q4H PRN    ibuprofen (MOTRIN) tablet 400 mg  400 mg Oral Q8H PRN    magnesium hydroxide (MILK OF MAGNESIA) 400 mg/5 mL oral suspension 30 mL  30 mL Oral DAILY PRN    nicotine (NICODERM CQ) 21 mg/24 hr patch 1 Patch  1 Patch TransDERmal DAILY PRN      SCHEDULED MEDICATIONS:   Current Facility-Administered Medications   Medication Dose Route Frequency    buPROPion XL (WELLBUTRIN XL) tablet 300 mg  300 mg Oral 7am    OLANZapine (ZyPREXA) tablet 20 mg  20 mg Oral QPM          ASSESSMENT & PLAN     DIAGNOSES REQUIRING ACTIVE TREATMENT AND MONITORING: (reviewed/updated 8/24/2017)  Patient Active Hospital Problem List:    schizoaffective disorder depressive type ( likely) vs substance induced psychotic disorder    Assessment: denies delusional themes. Denies SI/HI/AVH. Pt is homeless and high risk of relapse if he were to leave.     Plan: will ct to adjust med- Wellbutrin and Zyprexa     Polysubstance abuse (8/21/2017)    Assessment: THC,alcohol and cocaine per hx- relapsed- was sober for 1.5 month after rehab at [facility]- removed from half way house week ago due to [facility]-    Plan: educate, recommend rehab, limit use of BENZO-prn vistaril- does not want inpatient rehab      Dependence on nicotine from cigarettes (8/21/2017)    Assessment: sig use    Plan: offered nicotine patch    Personality disorder- antisocial traits- untruthful, manipulative, - strict limit setting      In summary, Krystle Strong, is a 25 y.o.  male who presents with a severe exacerbation of the principal diagnosis of Schizophrenia ([facility] [address]  Patient's condition is worsening/not improving/not stable . Patient requires continued inpatient hospitalization for further stabilization, safety monitoring and medication management. I will continue to coordinate the provision of individual, milieu, occupational, group, and substance abuse therapies to address target symptoms/diagnoses as deemed appropriate for the individual patient. A coordinated, multidisplinary treatment team round was conducted with the patient (this team consists of the nurse, psychiatric unit pharmcist,  and writer). Complete current electronic health record for patient has been reviewed today including consultant notes, ancillary staff notes, nurses and psychiatric tech notes. Suicide risk assessment completed and patient deemed to be of low risk for suicide at this time. The following regarding medications was addressed during rounds with patient:   the risks and benefits of the proposed medication. The patient was given the opportunity to ask questions. Informed consent given to the use of the above medications. Will continue to adjust psychiatric and non-psychiatric medications (see above \"medication\" section and orders section for details) as deemed appropriate & based upon diagnoses and response to treatment. I will continue to order blood tests/labs and diagnostic tests as deemed appropriate and review results as they become available (see orders for details and above listed lab/test results). I will order psychiatric records from previous [facility] hospitals to further elucidate the nature of patient's psychopathology and review once available. I will gather additional collateral information from friends, family and o/p treatment team to further elucidate the nature of patient's psychopathology and baselline level of psychiatric functioning.          I certify that this patient's inpatient psychiatric hospital services furnished since the previous certification were, and continue to be, required for treatment that could reasonably be expected to improve the patient's condition, or for diagnostic study, and that the patient continues to need, on a daily basis, active treatment furnished directly by or requiring the supervision of inpatient psychiatric facility personnel. In addition, the hospital records show that services furnished were intensive treatment services, admission or related services, or equivalent services.     EXPECTED DISCHARGE DATE/DAY: TBD     DISPOSITION: Home       Signed By:   Barby Stokes MD  8/24/2017

## 2017-08-24 NOTE — BH NOTES
GROUP THERAPY PROGRESS NOTE    The patient Josh Clark a 25 y.o. male is participating in [employer]. Group time: 1 hour    Personal goal for participation:  To concentrate on selected task    Goal orientation: social    Group therapy participation: minimal    Therapeutic interventions reviewed and discussed: Crafts, games, music    Impression of participation: The patient was present-left early    Gilberto Hyman  8/24/2017  4:58 PM

## 2017-08-24 NOTE — BH NOTES
The patient is medication and meal compliant. He complained that he felt anxious and he was given vistaril 50 mg po per his request.He has to be re directed at times because of his repetitive conversation and being intrusive. Q 15 minute safety checks.

## 2017-08-24 NOTE — DISCHARGE SUMMARY
PSYCHIATRIC DISCHARGE SUMMARY         IDENTIFICATION:    Patient Name  Collette Zimmerman   Date of Birth 1995   [facility] [id number]   Medical Record Number  [id number]      Age  25 y.o. PCP Adi Lopez MD   Admit date:  8/21/2017    Discharge date: 8/25/2017   Room Number  327/02  @ [facility]   Date of Service  8/25/2017            TYPE OF DISCHARGE: REGULAR               CONDITION AT DISCHARGE: improved       PROVISIONAL & DISCHARGE DIAGNOSES:    Problem List  Never Reviewed          Codes Class    Antisocial personality disorder ICD-10-CM: F60.2  ICD-9-CM: 301.7         * (Principal)Schizophrenia ([facility] [address] ICD-10-CM: F20.9  ICD-9-CM: 295.90         Polysubstance abuse ICD-10-CM: F19.10  ICD-9-CM: 305.90         Psychosis ICD-10-CM: F29  ICD-9-CM: 298.9         Dependence on nicotine from cigarettes ICD-10-CM: F17.210  ICD-9-CM: 305.1               Active Hospital Problems    Antisocial personality disorder      *[facility] ([facility]      Polysubstance abuse      Psychosis      Dependence on nicotine from cigarettes        DISCHARGE DIAGNOSIS:   Axis I:  SEE ABOVE  Axis II: SEE ABOVE  Axis III: SEE ABOVE  Axis IV:  Substance abuse,lack of structure  Axis V:  30 on admission, 70 on discharge (baseline)       CC & HISTORY OF PRESENT ILLNESS:  The patient, Collette Zimmerman, is a 25 y.o. WHITE OR  male with a past psychiatric history significant for schizoaffective disorder, who presents at this time with complaints of (and/or evidence of) the following emotional symptoms: psychotic behavior. Additional symptomatology include alcohol THC use, delusion of demons, antichrist and 7 deadly sin\", thought alienation- people can his mind, poor sleep and non compliance with med . The above symptoms have been present for few days. These symptoms are of severe severity. These symptoms are constant in nature. The patient's condition has been precipitated by and psychosocial stressors (substance abuse ). Patient's condition made worse by continued illicit drug use and alcohol use as well as treatment noncompliance. UDS: +MJ, Benzo; BAL=0.        SOCIAL HISTORY:    Social History     Social History    Marital status: SINGLE     Spouse name: N/A    Number of children: N/A    Years of education: N/A     Occupational History    Not on file. Social History Main Topics    Smoking status: Heavy Tobacco Smoker     Packs/day: 2.00    Smokeless tobacco: Never Used    Alcohol use Yes      Comment: occasionally    Drug use: Yes     Special: Marijuana, Benzodiazepines      Comment: sig cocaine use in the past. recently dc from [address] [place] [address]- was removed from half way house due to [facility] use    Sexual activity: Not on file     Other Topics Concern    Not on file     Social History Narrative    Single    No legal charges pending. FAMILY HISTORY:   Family History   Problem Relation Age of Onset    Schizophrenia Mother              HOSPITALIZATION COURSE:    Garrett Nava was admitted to the inpatient psychiatric unit [facility] for acute psychiatric stabilization in regards to symptomatology as described in the HPI above. The differential diagnosis at time of admission included: bipolar dis vs. schizoaffective vs. Schizophrenia. While on the unit Garrett Nava was involved in individual, group, occupational and milieu therapy. Psychiatric medications were adjusted during this hospitalization including Zyprexa and Wellbutrin XL. Garrett Nava demonstrated a slow, but progressive improvement in overall condition. Much of patient's depression appeared to be related to situational stressors, ffects of drugs of abuse, and psychological factors. Please see individual progress notes for more specific details regarding patient's hospitalization course. At time of discharge, Garrett Nava is without significant problems of depression psychosis  lance.  Patient free of suicidal and homicidal ideations (appears to be at very low risk of suicide or homicide) and reports many positive predictive factors in terms of not attempting suicide or homicide. Overall presentation at time of discharge is most consistent with the diagnosis of schizophrenia, polysubstance abuse. Patient with request for discharge today. There are no grounds to seek a TDO. Patient has maximized benefit to be derived from acute inpatient psychiatric treatment. All members of the treatment team concur with each other in regards to plans for discharge today per patient's request.  Patient and family are aware and in agreement with discharge and discharge plan. Per my last note: Pt denies SI/HI/AVH. No lance or psychosis. Affect is euthymic. Preoccupied with dc.          LABS AND IMAGAING:    Labs Reviewed   GLUCOSE, FASTING   TSH 3RD GENERATION   LIPID PANEL     No results found for: DS35, PHEN, PHENO, PHENT, DILF, DS39, PHENY, PTN, VALF2, VALAC, VALP, VALPR, DS6, CRBAM, CRBAMP, CARB2, XCRBAM  Admission on 08/21/2017   Component Date Value Ref Range Status    Glucose 08/22/2017 95  65 - 100 MG/DL Final    TSH 08/22/2017 1.50  0.36 - 3.74 uIU/mL Final    LIPID PROFILE 08/22/2017        Final    Cholesterol, total 08/22/2017 121  <200 MG/DL Final    Triglyceride 08/22/2017 100  <150 MG/DL Final    HDL Cholesterol 08/22/2017 34  MG/DL Final    LDL, calculated 08/22/2017 67  0 - 100 MG/DL Final    VLDL, calculated 08/22/2017 20  MG/DL Final    CHOL/HDL Ratio 08/22/2017 3.6  0 - 5.0   Final   Admission on 08/20/2017, Discharged on 08/21/2017   Component Date Value Ref Range Status    WBC 08/21/2017 6.9  4.1 - 11.1 K/uL Final    RBC 08/21/2017 4.65  4.10 - 5.70 M/uL Final    HGB 08/21/2017 13.8  12.1 - 17.0 g/dL Final    HCT 08/21/2017 41.7  36.6 - 50.3 % Final    MCV 08/21/2017 89.7  80.0 - 99.0 FL Final    MCH 08/21/2017 29.7  26.0 - 34.0 PG Final    MCHC 08/21/2017 33.1  30.0 - 36.5 g/dL Final    RDW 08/21/2017 16.8* 11.5 - 14.5 % Final    PLATELET 99/83/8099 743  150 - 400 K/uL Final    NEUTROPHILS 08/21/2017 43  32 - 75 % Final    LYMPHOCYTES 08/21/2017 42  12 - 49 % Final    MONOCYTES 08/21/2017 9  5 - 13 % Final    EOSINOPHILS 08/21/2017 5  0 - 7 % Final    BASOPHILS 08/21/2017 1  0 - 1 % Final    ABS. NEUTROPHILS 08/21/2017 3.0  1.8 - 8.0 K/UL Final    ABS. LYMPHOCYTES 08/21/2017 2.9  0.8 - 3.5 K/UL Final    ABS. MONOCYTES 08/21/2017 0.6  0.0 - 1.0 K/UL Final    ABS. EOSINOPHILS 08/21/2017 0.3  0.0 - 0.4 K/UL Final    ABS. BASOPHILS 08/21/2017 0.1  0.0 - 0.1 K/UL Final    Sodium 08/21/2017 141  136 - 145 mmol/L Final    Potassium 08/21/2017 3.5  3.5 - 5.1 mmol/L Final    Chloride 08/21/2017 103  97 - 108 mmol/L Final    CO2 08/21/2017 28  21 - 32 mmol/L Final    Anion gap 08/21/2017 10  5 - 15 mmol/L Final    Glucose 08/21/2017 93  65 - 100 mg/dL Final    BUN 08/21/2017 5* 6 - 20 MG/DL Final    Creatinine 08/21/2017 1.02  0.70 - 1.30 MG/DL Final    BUN/Creatinine ratio 08/21/2017 5* 12 - 20   Final    GFR est AA 08/21/2017 >60  >60 ml/min/1.73m2 Final    GFR est non-AA 08/21/2017 >60  >60 ml/min/1.73m2 Final    Calcium 08/21/2017 8.6  8.5 - 10.1 MG/DL Final    Bilirubin, total 08/21/2017 0.5  0.2 - 1.0 MG/DL Final    ALT (SGPT) 08/21/2017 24  12 - 78 U/L Final    AST (SGOT) 08/21/2017 10* 15 - 37 U/L Final    Alk.  phosphatase 08/21/2017 77  45 - 117 U/L Final    Protein, total 08/21/2017 6.9  6.4 - 8.2 g/dL Final    Albumin 08/21/2017 3.7  3.5 - 5.0 g/dL Final    Globulin 08/21/2017 3.2  2.0 - 4.0 g/dL Final    A-G Ratio 08/21/2017 1.2  1.1 - 2.2   Final    Color 08/20/2017 YELLOW/STRAW    Final    Appearance 08/20/2017 CLEAR  CLEAR   Final    Specific gravity 08/20/2017 1.007  1.003 - 1.030   Final    pH (UA) 08/20/2017 6.5  5.0 - 8.0   Final    Protein 08/20/2017 NEGATIVE   NEG mg/dL Final    Glucose 08/20/2017 NEGATIVE   NEG mg/dL Final    Ketone 08/20/2017 NEGATIVE   NEG mg/dL Final    Bilirubin 08/20/2017 NEGATIVE   NEG   Final    Blood 08/20/2017 NEGATIVE   NEG   Final    Urobilinogen 08/20/2017 2.0* 0.2 - 1.0 EU/dL Final    Nitrites 08/20/2017 NEGATIVE   NEG   Final    Leukocyte Esterase 08/20/2017 NEGATIVE   NEG   Final    WBC 08/20/2017 0-4  0 - 4 /hpf Final    RBC 08/20/2017 0-5  0 - 5 /hpf Final    Epithelial cells 08/20/2017 FEW  FEW /lpf Final    Bacteria 08/20/2017 NEGATIVE   NEG /hpf Final    Hyaline cast 08/20/2017 0-2  0 - 5 /lpf Final    AMPHETAMINES 08/20/2017 NEGATIVE   NEG   Final    BARBITURATES 08/20/2017 NEGATIVE   NEG   Final    BENZODIAZEPINE 08/20/2017 POSITIVE* NEG   Final    COCAINE 08/20/2017 NEGATIVE   NEG   Final    METHADONE 08/20/2017 NEGATIVE   NEG   Final    OPIATES 08/20/2017 NEGATIVE   NEG   Final    PCP(PHENCYCLIDINE) 08/20/2017 NEGATIVE   NEG   Final    THC (TH-CANNABINOL) 08/20/2017 POSITIVE* NEG   Final    Drug screen comment 08/20/2017 (NOTE)   Final    Acetaminophen level 08/21/2017 <2* 10 - 30 ug/mL Final    SALICYLATE 43/12/9435 2.7* 2.8 - 20.0 MG/DL Final    ALCOHOL(ETHYL),SERUM 08/21/2017 <10  <10 MG/DL Final     No results found. DISPOSITION:    Home. Patient to f/u with drug/etoh rehabilitation, psychiatric, and psychotherapy appointments. Patient is to f/u with internist as directed. FOLLOW-UP CARE:    Activity as tolerated  Regular Diet  Wound Care: none needed. Follow-up Information     Follow up With Details Comments [address] [address] [place]   [phone]                   PROGNOSIS:    Guarded / Poor---- based on nature of patient's pathology/ies and treatment compliance issues. Prognosis is greatly dependent upon patient's ability to remain sober and to follow up with drug/etoh rehabilitation and psychiatric/psychotherapy appointments as well as to comply with psychiatric medications as prescribed.             DISCHARGE MEDICATIONS:    Informed consent given for the use of following psychotropic medications:  Current Discharge Medication List      START taking these medications    Details   buPROPion XL (WELLBUTRIN XL) 300 mg XL tablet Take 1 Tab by mouth every morning for 14 days. Indications: major depressive disorder  Qty: 14 Tab, Refills: 0      !! OLANZapine (ZYPREXA) 20 mg tablet Take 1 Tab by mouth every evening for 14 days. Indications: SCHIZOPHRENIA  Qty: 14 Tab, Refills: 0       !! - Potential duplicate medications found. Please discuss with provider. CONTINUE these medications which have NOT CHANGED    Details   !! OLANZapine (ZYPREXA) 20 mg tablet Take 20 mg by mouth nightly. !! - Potential duplicate medications found. Please discuss with provider. A coordinated, multidisplinary treatment team round was conducted with Maninder Flores is done daily here at [facility]. This team consists of the nurse, psychiatric unit pharmcist,  and writer. I have spent greater than 35 minutes on discharge work.     Signed:  Dana Hein MD  8/25/2017

## 2017-08-24 NOTE — BH NOTES
The patient Gayle Dobson is participating in Relaxation Group. Group time: 30 minutes    Personal goal for participation: personal  Goal orientation:  To learn to relax    Group therapy participation: Active    Therapeutic interventions reviewed and discussed: Yes    Halie Blanton  8/23/2017

## 2017-08-24 NOTE — BH NOTES
GROUP THERAPY PROGRESS NOTE    Julia Orlando is participating in [place].      Group time: 30 minutes    Personal goal for participation: daily orientation    Goal orientation: personal    Group therapy participation: active    Therapeutic interventions reviewed and discussed: yes    Impression of participation: cooperative

## 2017-08-24 NOTE — PROGRESS NOTES
GROUP THERAPY PROGRESS NOTE      Kirstenjoel Coronado was present for medication group. GROUP TIME: 45 minutes, Thursdays 2pm    PERSONAL GOAL FOR PARTICIPATION: To be present for group, participate in discussion, and answer patient-directed questions. GOAL ORIENTATION: Personal    THERAPEUTIC INTERVENTIONS REVIEWED AND DISCUSSED: The following topics were presented: storage of medications, how to remember to refill medications and keep up with doctor appointments, relapse prevention, keeping a record of all medication including prescription and non-prescription drugs, and who to contact with medication questions. Patients were given time to ask questions regarding their current therapy. IMPRESSION OF PARTICIPATION: Patient sat with his head resting on the table for the majority of group and did not participate in group discussions. He did participate in medication bingo.      Ida Malcolm, MAGDIELD, BCPS

## 2017-08-24 NOTE — BH NOTES
GROUP THERAPY PROGRESS NOTE    The patient Leyla chatterjee 25 y.o. male is participating in [place]. Group time: 45 minutes    Personal goal for participation: To participate in mental health journey game    Goal orientation:  personal    Group therapy participation: active    Therapeutic interventions reviewed and discussed: choices in recovery    Impression of participation:  The patient was attentive.     Kamryn Matson  8/24/2017  1:02 PM

## 2017-08-25 VITALS
OXYGEN SATURATION: 98 % | SYSTOLIC BLOOD PRESSURE: 119 MMHG | HEIGHT: 67 IN | BODY MASS INDEX: 34.09 KG/M2 | TEMPERATURE: 97.3 F | WEIGHT: 217.2 LBS | RESPIRATION RATE: 18 BRPM | HEART RATE: 78 BPM | DIASTOLIC BLOOD PRESSURE: 72 MMHG

## 2017-08-25 PROCEDURE — 74011250637 HC RX REV CODE- 250/637: Performed by: PSYCHIATRY & NEUROLOGY

## 2017-08-25 RX ADMIN — BUPROPION HYDROCHLORIDE 300 MG: 150 TABLET, FILM COATED, EXTENDED RELEASE ORAL at 08:13

## 2017-08-25 NOTE — BH NOTES
GROUP THERAPY PROGRESS NOTE    Carlie Galeasjoséshabana is participating in [place].      Group time: 30 minutes    Personal goal for participation: daily orientation    Goal orientation: personal    Group therapy participation: active    Therapeutic interventions reviewed and discussed: yes    Impression of participation: Attend    Vignesh Banda  8/25/2017

## 2017-08-25 NOTE — BH NOTES
REFLECTIONS GROUP THERAPY PROGRESS NOTE    The patient [place] Player is participating in Reflections Group Therapy. Group time: 30 minutes    Personal goal for participation: Share with group about feelings and concerns throughout the course of the day. Goal orientation: personal    Group therapy participation: active    Therapeutic interventions reviewed and discussed: Yes    Impression of participation:   Positive input.     Anusha Coughlin Call  8/24/2017

## 2017-08-25 NOTE — BH NOTES
Pt resting quietly in bed with eyes closed, respiration even and unlabored, no sign of any distress at this time. Pt will continue to be monitored per hospital protocol for safety and needs. Pt slept about 7 hours.

## 2017-08-25 NOTE — BH NOTES
Social Work    Mr. Parker Ledezma will be discharging to [facility]. Mr. Canales Car will be transported by volunteer [facility] transportation. Pt is alert and oriented. Pt denies SI/HI. Pt is free of delusions. Pt's mood was irritable, affect is congruent with mood. Pt's insight and judgment were limited, reliability was untruthful. Pt is in agreement with this plan and will follow up with Dr. Twan Bella in community. Pt was given a list of resources for Substance abuse including [facility], [facility], [facility] and [facility]. Follow-up appointment:    [facility] Office  [address]   [place] [address]  [phone]     9/11/17  Dr Twan Bella at 1:50 PM  Therapy appt with Carmina Gonzales at 1:15 PM

## 2018-02-26 ENCOUNTER — OFFICE VISIT (OUTPATIENT)
Dept: NEUROLOGY | Age: 23
End: 2018-02-26

## 2018-02-26 VITALS
SYSTOLIC BLOOD PRESSURE: 142 MMHG | WEIGHT: 230 LBS | DIASTOLIC BLOOD PRESSURE: 78 MMHG | RESPIRATION RATE: 20 BRPM | BODY MASS INDEX: 34.86 KG/M2 | HEIGHT: 68 IN

## 2018-02-26 DIAGNOSIS — R29.898 RIGHT LEG WEAKNESS: Primary | ICD-10-CM

## 2018-02-26 DIAGNOSIS — S74.11XA INJURY OF RIGHT FEMORAL NERVE, INITIAL ENCOUNTER: ICD-10-CM

## 2018-02-26 RX ORDER — ALPRAZOLAM 0.25 MG/1
TABLET ORAL
COMMUNITY
End: 2018-02-26

## 2018-02-26 RX ORDER — OXYCODONE AND ACETAMINOPHEN 10; 325 MG/1; MG/1
TABLET ORAL
COMMUNITY
End: 2018-02-26

## 2018-02-26 RX ORDER — BUPROPION HYDROCHLORIDE 300 MG/1
300 TABLET ORAL
COMMUNITY

## 2018-02-26 RX ORDER — OLANZAPINE 20 MG/1
20 TABLET ORAL
COMMUNITY

## 2018-02-26 NOTE — PATIENT INSTRUCTIONS
Learning About Constantino Wan  What is a living will? A living will is a legal form you use to write down the kind of care you want at the end of your life. It is used by the health professionals who will treat you if you aren't able to decide for yourself. If you put your wishes in writing, your loved ones and others will know what kind of care you want. They won't need to guess. This can ease your mind and be helpful to others. A living will is not the same as an estate or property will. An estate will explains what you want to happen with your money and property after you die. Is a living will a legal document? A living will is a legal document. Each state has its own laws about living bryan. If you move to another state, make sure that your living will is legal in the state where you now live. Or you might use a universal form that has been approved by many states. This kind of form can sometimes be completed and stored online. Your electronic copy will then be available wherever you have a connection to the Internet. In most cases, doctors will respect your wishes even if you have a form from a different state. · You don't need an  to complete a living will. But legal advice can be helpful if your state's laws are unclear, your health history is complicated, or your family can't agree on what should be in your living will. · You can change your living will at any time. Some people find that their wishes about end-of-life care change as their health changes. · In addition to making a living will, think about completing a medical power of  form. This form lets you name the person you want to make end-of-life treatment decisions for you (your \"health care agent\") if you're not able to. Many hospitals and nursing homes will give you the forms you need to complete a living will and a medical power of .   · Your living will is used only if you can't make or communicate decisions for yourself anymore. If you become able to make decisions again, you can accept or refuse any treatment, no matter what you wrote in your living will. · Your state may offer an online registry. This is a place where you can store your living will online so the doctors and nurses who need to treat you can find it right away. What should you think about when creating a living will? Talk about your end-of-life wishes with your family members and your doctor. Let them know what you want. That way the people making decisions for you won't be surprised by your choices. Think about these questions as you make your living will:  · Do you know enough about life support methods that might be used? If not, talk to your doctor so you know what might be done if you can't breathe on your own, your heart stops, or you're unable to swallow. · What things would you still want to be able to do after you receive life-support methods? Would you want to be able to walk? To speak? To eat on your own? To live without the help of machines? · If you have a choice, where do you want to be cared for? In your home? At a hospital or nursing home? · Do you want certain Voodoo practices performed if you become very ill? · If you have a choice at the end of your life, where would you prefer to die? At home? In a hospital or nursing home? Somewhere else? · Would you prefer to be buried or cremated? · Do you want your organs to be donated after you die? What should you do with your living will? · Make sure that your family members and your health care agent have copies of your living will. · Give your doctor a copy of your living will to keep in your medical record. If you have more than one doctor, make sure that each one has a copy. · You may want to put a copy of your living will where it can be easily found. Where can you learn more? Go to http://fiona-pedro.info/.   Enter Q852 in the search box to learn more about \"Learning About Living Bang. \"  Current as of: September 24, 2016  Content Version: 11.4  © 4294-9039 [employer]. Care instructions adapted under license by [employer] (which disclaims liability or warranty for this information). If you have questions about a medical condition or this instruction, always ask your healthcare professional. [address] any warranty or liability for your use of this information. Advance Directives: Care Instructions  Your Care Instructions  An advance directive is a legal way to state your wishes at the end of your life. It tells your family and your doctor what to do if you can no longer say what you want. There are two main types of advance directives. You can change them any time that your wishes change. · A living will tells your family and your doctor your wishes about life support and other treatment. · A durable power of  for health care lets you name a person to make treatment decisions for you when you can't speak for yourself. This person is called a health care agent. If you do not have an advance directive, decisions about your medical care may be made by a doctor or a  who doesn't know you. It may help to think of an advance directive as a gift to the people who care for you. If you have one, they won't have to make tough decisions by themselves. Follow-up care is a key part of your treatment and safety. Be sure to make and go to all appointments, and call your doctor if you are having problems. It's also a good idea to know your test results and keep a list of the medicines you take. How can you care for yourself at home? · Discuss your wishes with your loved ones and your doctor. This way, there are no surprises. · Many states have a unique form. Or you might use a universal form that has been approved by many states. This kind of form can sometimes be completed and stored online.  Your electronic copy will then be available wherever you have a connection to the Internet. In most cases, doctors will respect your wishes even if you have a form from a different state. · You don't need a  to do an advance directive. But you may want to get legal advice. · Think about these questions when you prepare an advance directive:  ¨ Who do you want to make decisions about your medical care if you are not able to? Many people choose a family member or close friend. ¨ Do you know enough about life support methods that might be used? If not, talk to your doctor so you understand. ¨ What are you most afraid of that might happen? You might be afraid of having pain, losing your independence, or being kept alive by machines. ¨ Where would you prefer to die? Choices include your home, a hospital, or a nursing home. ¨ Would you like to have information about hospice care to support you and your family? ¨ Do you want to donate organs when you die? ¨ Do you want certain Episcopalian practices performed before you die? If so, put your wishes in the advance directive. · Read your advance directive every year, and make changes as needed. When should you call for help? Be sure to contact your doctor if you have any questions. Where can you learn more? Go to http://fiona-pedro.info/. Enter R264 in the search box to learn more about \"Advance Directives: Care Instructions. \"  Current as of: September 24, 2016  Content Version: 11.4  © 4498-4887 its learning. Care instructions adapted under license by [employer] (which disclaims liability or warranty for this information). If you have questions about a medical condition or this instruction, always ask your healthcare professional. [place] [address] any warranty or liability for your use of this information.   [facility]note 3[facility]   Statement to Patients  April 1, 2014      In an effort to ensure the large volume of patient prescription refills is processed in the most efficient and expeditious manner, we are asking our patients to assist us by calling your Pharmacy for all prescription refills, this will include also your  Mail Order Pharmacy. The pharmacy will contact our office electronically to continue the refill process. Please do not wait until the last minute to call your pharmacy. We need at least 48 hours (2days) to fill prescriptions. We also encourage you to call your pharmacy before going to  your prescription to make sure it is ready. With regard to controlled substance prescription refill requests (narcotic refills) that need to be picked up at our office, we ask your cooperation by providing us with at least 72 hours (3days) notice that you will need a refill. We will not refill narcotic prescription refill requests after 4:00pm on any weekday, Monday through Thursday, or after 2:00pm on Fridays, or on the weekends. We encourage everyone to explore another way of getting your prescription refill request processed using [employer], our patient web portal through our electronic medical record system. [employer] is an efficient and effective way to communicate your medication request directly to the office and  downloadable as an sharri on your smart phone . [employer] also features a review functionality that allows you to view your medication list as well as leave messages for your physician. Are you ready to get connected? If so please review the attatched instructions or speak to any of our staff to get you set up right away! Thank you so much for your cooperation. Should you have any questions please contact our Practice Administrator.     The Physicians and Staff,  [facility]     Patient Instructions/Plans:

## 2018-02-26 NOTE — PROGRESS NOTES
NEUROLOGY NEW PATIENT CONSULTATION    REFERRED BY:  No primary care provider on file. CHIEF COMPLAINT:  Right leg weakness    HISTORY OF PRESENT ILLNESS    HISTORY PROVIDED BY:  Patient  Family Member: brother      Jodi Saleem is a 25 y.o. male who I am asked to see in consultation for right leg weakness. Patient is currently using a walker due to weakness. He is a drug addict. He overdosed on xanax and pain meds on February 5. He woke up and couldn't move his right leg. The people he was with dropped him off at his apartment and left him there. His family found him and rushed him to the emergency room. He was admitted at [facility] for several weeks. He was in acute renal failure which had to be treated. He mostly noticed with his right leg that it was proximal weakness. He feels like distally he is doing okay. He has a mild foot drop but can lift his foot. He is not experiencing any pain. The only pain he experiences when pushing down on his quad it can hurt. He also has some tenderness in his right [place]. Patient recalls coming to him being in an awkward position that he been in for several hours and he thinks this is what caused the leg weakness. While at [facility] he did have an MRI of the lumbar spine that was reportedly fine. Will get these records. He has issues with his entire right leg now. He has a mild drop foot. Patient denies any bowel or bladder incontinence. He has been to rehab x 2. He does follow with psychiatry and goes to NA meetings. No issues since his hospitalization. His memory is okay. He can remember things with triggers. 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 He has been out of work for the month and is slowly transitioning back up. He does have to sit all day. His nephrologist have given short term disabiltlty and he is trying to work his way back to work but he is having some issues.     PMH  No significant past medical history    SH  Social History     Social History    Marital status: UNKNOWN     Spouse name: N/A    Number of children: N/A    Years of education: N/A     Social History Main Topics    Smoking status: Not on file    Smokeless tobacco: Not on file    Alcohol use Not on file    Drug use: Not on file    Sexual activity: Not on file     Other Topics Concern    Not on file     Social History Narrative   History of abuse of prescription drug medication, but patient reports he is sober ×2 weeks    FH   no significant family history     ALLERGIES  no known drug allergies     CURRENT MEDS  Current Outpatient Prescriptions   Medication Sig Dispense Refill    OLANZapine (ZYPREXA) 20 mg tablet Take 20 mg by mouth nightly.  buPROPion XL (WELLBUTRIN XL) 300 mg XL tablet Take 300 mg by mouth every morning. REVIEW OF SYSTEMS:     Y  N       Y  N  Y  N   Y  N  [] [x] AIDS          [] [x] Falls  [] [x] Memory Loss  [] [x]  Shortness of breath  [] [x] Anxiety          [] [x] Fatigue [] [x] Muscle Pain        [] [x]  Skipped beats  [] [x] Chest Pain   [] [x] Frequent HA [x] [] Ms Weakness     [] [x]  Snoring  [] [x] Constipation [] [x]Hearing loss [x] [] Nause/Vomiting  [] [x]  Stomach Pain  [] [x] Cough          [] [x]Hepatitis [] [x] Neuropathy         [] [x]  Swallowing difficulty  [] [x] Depression  [] [x]Incontinence [x] [] Poor appetite      [] [x]  Vertigo  [] [x] Diarrhea       [] [x] Joint Pain [] [x] Rash                   [] [x]  Visual disturbances  [] [x] Fainting        [x] [] Leg Swelling [] [x] Ringing ears       [] [x]  Weight changes      []Unable to obtain  ROS due to  []mental status change  []sedated   []intubated          PREVIOUS WORKUP  IMAGING: MRI lumbar spine: Normal per patient. Will get these records. LABS  No results found for this or any previous visit.     PHYSICAL EXAM  Visit Vitals    /78    Resp 20    Ht 5' 8\" (1.727 m)    Wt 104.3 kg (230 lb)    BMI 34.97 kg/m2     General:  Alert, cooperative, no distress. Head:  Normocephalic, without obvious abnormality, atraumatic. Eyes:  Conjunctivae/corneas clear. Pupils equal, round, reactive to light. Extraocular movements intact, VFF, NO papilledema   Lungs:  Heart:   Non labored breathing  Regular rate and rhythm, no carotid bruits   Abdomen:   Soft, non-distended   Extremities: Extremities normal, atraumatic, no cyanosis or edema. Pulses: 2+ and symmetric all extremities. Skin: Skin color, texture, turgor normal. No rashes or lesions. Neurologic:  Gen: Attention normal             Language: naming, repetition, fluency normal             Memory: intact recent and remote memory  Cranial Nerves:  I: smell Not tested   II: visual fields Full to confrontation   II: pupils Equal, round, reactive to light   II: optic disc No papilledema   III,VII: ptosis none   III,IV,VI: extraocular muscles  Full ROM   V: mastication normal   V: facial light touch sensation  normal   VII: facial muscle function   symmetric   VIII: hearing symmetric   IX: soft palate elevation  normal   XI: trapezius strength  5/5   XI: sternocleidomastoid strength 5/5   XI: neck flexion strength  5/5   XII: tongue  midline     Motor: normal bulk and tone, no tremor              Strength:   DELT BICEP TRICEP IHM IP QUAD HAMSTR GASTROC TA   RIGHT 5/5 5/5 5/5 5/5 3/5 3/5 4/5 4/5 4/5   LEFT 5/5 5/5 5/5 5/5 5/5 5/5 5/5 5/5 5/5       Sensory: Decreased pinprick on right lateral thigh to the knee  Coordination: FTN intact  Gait: Hemiparetic gait due to weakness and requires a walker  Reflexes: 2+ throughout except 0 in right patellar and right ankle       IMPRESSION  Benja Rivera is a 25 y.o. male who presents for evaluation of right leg weakness. 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 He has more proximal weakness than distal.  He has had improvement over the last several weeks. Would like to do an EMG/NCS for further evaluation.   Will also obtain a copy of the MRI of the lumbar spine that he had that was normal.    RECOMMENDATIONS  1. MRI lumbar spine normal per patient. Will get this record  2. EMG/NCS of bilateral lower extremities  3. Referral for physical therapy for likely femoral nerve compression  4. May need to assist with accommodations for work as he is transitioning back. He will some paperwork if needed  5. No neuropathic pain medication needed at this time  6. Encouraged patient to continue to follow psychiatry as he is doing    FU 4 weeks    Chao Sanchez MD    CC: No primary care provider on file. Fax: None    This note was created using voice recognition software. Despite editing, there may be syntax errors. This note will not be viewable in [address]

## 2018-02-26 NOTE — MR AVS SNAPSHOT
[address] [address] 
[phone] Patient: Nery Calles MRN: [id number] :1995 Visit Information Date & Time Provider Department Dept. Phone Encounter #  
 2018 11:00 AM Kasia Corado MD [address] [facility] [phone] [id number] Upcoming Health Maintenance Date Due  
 HPV AGE 9Y-34Y (1 of 3 - Male 3 Dose Series) 3/26/2007 DTaP/Tdap/Td series (1 - Tdap) 3/26/2017 Influenza Age 5 to Adult 2017 Allergies as of 2018  Never Reviewed Not on File Current Immunizations  Never Reviewed No immunizations on file. Not reviewed this visit You Were Diagnosed With   
  
 Codes Comments Right leg weakness    -  Primary ICD-10-CM: R29.898 ICD-9-CM: 729.89 Injury of right femoral nerve, initial encounter     ICD-10-CM: G73.67JP ICD-9-CM: 991.7 Vitals BP Resp Height(growth percentile) Weight(growth percentile) BMI Smoking Status 142/78 20 5' 8\" (1.727 m) 230 lb (104.3 kg) 34.97 kg/m2 Never Assessed Vitals History BMI and BSA Data Body Mass Index Body Surface Area 34.97 kg/m 2 2.24 m 2 Your Updated Medication List  
  
   
This list is accurate as of 18 11:51 AM.  Always use your most recent med list.  
  
  
  
  
 WELLBUTRIN  mg XL tablet Generic drug:  buPROPion XL Take 300 mg by mouth every morning. ZyPREXA 20 mg tablet Generic drug:  OLANZapine Take 20 mg by mouth nightly. We Performed the Following REFERRAL TO PHYSICAL THERAPY [NGJ97 Custom] Comments:  
 Pivot for likely right femoral nerve compression and needs strengthening To-Do List   
 2018 Neurology:  EMG LIMITED Referral Information Referral ID Referred By Referred To  
  
 [id number] Danis Collins Not Available Visits Status Start Date End Date 1 New Request 2/26/18 2/26/19 If your referral has a status of pending review or denied, additional information will be sent to support the outcome of this decision. Patient Instructions Waqas Silvestre 1721 What is a living will? A living will is a legal form you use to write down the kind of care you want at the end of your life. It is used by the health professionals who will treat you if you aren't able to decide for yourself. If you put your wishes in writing, your loved ones and others will know what kind of care you want. They won't need to guess. This can ease your mind and be helpful to others. A living will is not the same as an estate or property will. An estate will explains what you want to happen with your money and property after you die. Is a living will a legal document? A living will is a legal document. Each state has its own laws about living bryan. If you move to another state, make sure that your living will is legal in the state where you now live. Or you might use a universal form that has been approved by many states. This kind of form can sometimes be completed and stored online. Your electronic copy will then be available wherever you have a connection to the Internet. In most cases, doctors will respect your wishes even if you have a form from a different state. · You don't need an  to complete a living will. But legal advice can be helpful if your state's laws are unclear, your health history is complicated, or your family can't agree on what should be in your living will. · You can change your living will at any time. Some people find that their wishes about end-of-life care change as their health changes. · In addition to making a living will, think about completing a medical power of  form.  This form lets you name the person you want to make end-of-life treatment decisions for you (your \"health care agent\") if you're not able to. Many hospitals and nursing homes will give you the forms you need to complete a living will and a medical power of . · Your living will is used only if you can't make or communicate decisions for yourself anymore. If you become able to make decisions again, you can accept or refuse any treatment, no matter what you wrote in your living will. · Your state may offer an online registry. This is a place where you can store your living will online so the doctors and nurses who need to treat you can find it right away. What should you think about when creating a living will? Talk about your end-of-life wishes with your family members and your doctor. Let them know what you want. That way the people making decisions for you won't be surprised by your choices. Think about these questions as you make your living will: · Do you know enough about life support methods that might be used? If not, talk to your doctor so you know what might be done if you can't breathe on your own, your heart stops, or you're unable to swallow. · What things would you still want to be able to do after you receive life-support methods? Would you want to be able to walk? To speak? To eat on your own? To live without the help of machines? · If you have a choice, where do you want to be cared for? In your home? At a hospital or nursing home? · Do you want certain Islam practices performed if you become very ill? · If you have a choice at the end of your life, where would you prefer to die? At home? In a hospital or nursing home? Somewhere else? · Would you prefer to be buried or cremated? · Do you want your organs to be donated after you die? What should you do with your living will? · Make sure that your family members and your health care agent have copies of your living will.  
· Give your doctor a copy of your living will to keep in your medical record. If you have more than one doctor, make sure that each one has a copy. · You may want to put a copy of your living will where it can be easily found. Where can you learn more? Go to http://fiona-pedro.info/. Enter J788 in the search box to learn more about \"Learning About Living Bang. \" Current as of: September 24, 2016 Content Version: 11.4 © 7685-8972 [employer]. Care instructions adapted under license by [employer] (which disclaims liability or warranty for this information). If you have questions about a medical condition or this instruction, always ask your healthcare professional. [address] [address] any warranty or liability for your use of this information. Advance Directives: Care Instructions Your Care Instructions An advance directive is a legal way to state your wishes at the end of your life. It tells your family and your doctor what to do if you can no longer say what you want. There are two main types of advance directives. You can change them any time that your wishes change. · A living will tells your family and your doctor your wishes about life support and other treatment. · A durable power of  for health care lets you name a person to make treatment decisions for you when you can't speak for yourself. This person is called a health care agent. If you do not have an advance directive, decisions about your medical care may be made by a doctor or a  who doesn't know you. It may help to think of an advance directive as a gift to the people who care for you. If you have one, they won't have to make tough decisions by themselves. Follow-up care is a key part of your treatment and safety. Be sure to make and go to all appointments, and call your doctor if you are having problems. It's also a good idea to know your test results and keep a list of the medicines you take. How can you care for yourself at home? · Discuss your wishes with your loved ones and your doctor. This way, there are no surprises. · Many states have a unique form. Or you might use a universal form that has been approved by many states. This kind of form can sometimes be completed and stored online. Your electronic copy will then be available wherever you have a connection to the Internet. In most cases, doctors will respect your wishes even if you have a form from a different state. · You don't need a  to do an advance directive. But you may want to get legal advice. · Think about these questions when you prepare an advance directive: ¨ Who do you want to make decisions about your medical care if you are not able to? Many people choose a family member or close friend. ¨ Do you know enough about life support methods that might be used? If not, talk to your doctor so you understand. ¨ What are you most afraid of that might happen? You might be afraid of having pain, losing your independence, or being kept alive by machines. ¨ Where would you prefer to die? Choices include your home, a hospital, or a nursing home. ¨ Would you like to have information about hospice care to support you and your family? ¨ Do you want to donate organs when you die? ¨ Do you want certain Rastafari practices performed before you die? If so, put your wishes in the advance directive. · Read your advance directive every year, and make changes as needed. When should you call for help? Be sure to contact your doctor if you have any questions. Where can you learn more? Go to http://fiona-pedro.info/. Enter R264 in the search box to learn more about \"Advance Directives: Care Instructions. \" Current as of: September 24, 2016 Content Version: 11.4 © 5120-1246 [employer].  Care instructions adapted under license by [employer] (which disclaims liability or warranty for this information). If you have questions about a medical condition or this instruction, always ask your healthcare professional. [address] any warranty or liability for your use of this information. PRESCRIPTION REFILL POLICY [facility] Statement to Patients April 1, 2014 In an effort to ensure the large volume of patient prescription refills is processed in the most efficient and expeditious manner, we are asking our patients to assist us by calling your Pharmacy for all prescription refills, this will include also your  Mail Order Pharmacy. The pharmacy will contact our office electronically to continue the refill process. Please do not wait until the last minute to call your pharmacy. We need at least 48 hours (2days) to fill prescriptions. We also encourage you to call your pharmacy before going to  your prescription to make sure it is ready. With regard to controlled substance prescription refill requests (narcotic refills) that need to be picked up at our office, we ask your cooperation by providing us with at least 72 hours (3days) notice that you will need a refill. We will not refill narcotic prescription refill requests after 4:00pm on any weekday, Monday through Thursday, or after 2:00pm on Fridays, or on the weekends. We encourage everyone to explore another way of getting your prescription refill request processed using Cloud[employer], our patient web portal through our electronic medical record system. Cloud[employer] is an efficient and effective way to communicate your medication request directly to the office and  downloadable as an sharri on your smart phone . [employer] also features a review functionality that allows you to view your medication list as well as leave messages for your physician. Are you ready to get connected? If so please review the attatched instructions or speak to any of our staff to get you set up right away! Thank you so much for your cooperation. Should you have any questions please contact our Practice Administrator. The Physicians and Staff,  [facility] Patient Instructions/Plans: 
 
 
 
  
Introducing [facility]! [employer] introduces [email or web address] patient portal. Now you can access parts of your medical record, email your doctor's office, and request medication refills online. 1. In your internet browser, go to [email or web address] 2. Click on the First Time User? Click Here link in the Sign In box. You will see the New Member Sign Up page. 3. Enter your [employer] Access Code exactly as it appears below. You will not need to use this code after youve completed the sign-up process. If you do not sign up before the expiration date, you must request a new code. · [employer] Access Code: [email or web address] Expires: 5/27/2018 11:49 AM 
 
4. Enter the last four digits of your Social Security Number (xxxx) and Date of Birth (mm/dd/yyyy) as indicated and click Submit. You will be taken to the next sign-up page. 5. Create a [employer] [place] ID. This will be your [employer] login ID and cannot be changed, so think of one that is secure and easy to remember. 6. Create a [employer] password. You can change your password at any time. 7. Enter your Password Reset Question and Answer. This can be used at a later time if you forget your password. 8. Enter your e-mail address. You will receive e-mail notification when new information is available in [address]. 9. Click Sign Up. You can now view and download portions of your medical record. 10. Click the Download Summary menu link to download a portable copy of your medical information. If you have questions, please visit the Frequently Asked Questions section of the [employer] website. Remember, [employer] is NOT to be used for urgent needs. For medical emergencies, dial 911. Now available from your iPhone and Android! Please provide this summary of care documentation to your next provider. If you have any questions after today's visit, please call [phone].

## 2018-03-07 ENCOUNTER — TELEPHONE (OUTPATIENT)
Dept: NEUROLOGY | Age: 23
End: 2018-03-07

## 2018-03-07 NOTE — TELEPHONE ENCOUNTER
Awilda from [facility] kidney specialist is not on hipaa  Called and let patient know he will need to fill out ARLET  He states he will come to [place]

## 2018-03-08 ENCOUNTER — OFFICE VISIT (OUTPATIENT)
Dept: NEUROLOGY | Age: 23
End: 2018-03-08

## 2018-03-08 DIAGNOSIS — R53.1 WEAKNESS: Primary | ICD-10-CM

## 2018-03-08 NOTE — PROCEDURES
EMG/ NCS Report   [facility] [address] [place], [address]   Ph: [phone]   FAX: [phone]/ [phone]  Test Date:  3/8/2018    Patient: Jarvis Castillo : 1995 Physician: Batool Anderson MD   Sex: Male Height: ' \" Ref Thuy Bonds   ID#: [id number] Weight:  lbs. Technician: Alexandra Cota     Patient History / Exam:  Last 2018, patient overdosed and was sitting on his right side in an \"Congolese seat\" position for several hours. Patient woke up with right leg weakness and tingling. Patient is coming for plexopathy evaluation. EMG & NCV Findings:  Evaluation of the left Fibular motor and the right Fibular motor nerves showed normal distal onset latency (L4.5, R3.9 ms), normal amplitude (L9.0, R7.1 mV), normal conduction velocity (B Fib-Ankle, L45, R45 m/s), and normal conduction velocity (Poplt-B Fib, L53, R59 m/s). The left tibial motor and the right tibial motor nerves showed normal distal onset latency (L4.1, R4.1 ms), normal amplitude (L15.0, R11.5 mV), and normal conduction velocity (Knee-Ankle, L46, R44 m/s). The left Sup Fibular sensory, the right Sup Fibular sensory, the left sural sensory, and the right sural sensory nerves showed normal distal peak latency (L2.8, R2.6, L4.0, R3.3 ms) and normal amplitude (L7.4, R29.0, L14.5, R14.1 µV). All F Wave latencies were within normal limits. All F Wave left vs. right side latency differences were within normal limits. All H Reflex left vs. right side latency differences were within normal limits. Needle evaluation of the right extensor digitorum brevis, the right medial gastrocnemius, the right gluteus medius, and the right gluteus nneka muscles showed slightly increased spontaneous activity and recruitment. The right abductor hallucis, the right anterior tibialis, and the right posterior tibialis muscles showed recruitment.   The right vastus lateralis, the right rectus femoris, and the right biceps femoris (short head) muscles showed slightly increased spontaneous activity and diminished recruitment. The right semitendinosus muscle showed diminished recruitment. All remaining muscles (as indicated in the following table) showed no evidence of electrical instability. Impression:    ABNORMAL    Extensive electrodiagnostic examination of the right lower extremity, with additional nerve conduction study of the left lower extremity, shows findings consistent with a patchy subacute right lumbosacral plexopathy, affecting the proximal muscles more than the distal muscles of the right lower extremity. Consider repeating electrodiagnostic testing in 3 months if no clinical improvement is noted.           Michael Rosen MD  Diplomate, American Board of Psychiatry and Neurology  Diplomate, Neuromuscular Medicine  Diplomate, American Board of Electrodiagnostic Medicine  , [address] [place] [address] Accredited Laboratory with Exemplary Status      Nerve Conduction Studies  Anti Sensory Summary Table     Stim Site NR Peak (ms) Norm Peak (ms) P-T Amp (µV) Norm P-T Amp Site1 Site2 Dist (cm)   Left Sup Fibular Anti Sensory (Lat ankle)  31.2°C   Lower leg    2.8 <4.4 7.4 >6 Lower leg Lat ankle 10.0   Right Sup Fibular Anti Sensory (Lat ankle)  33.1°C   Lower leg    2.6 <4.4 29.0 >6 Lower leg Lat ankle 10.0   Left Sural Anti Sensory (Lat Mall)  29.7°C   Calf    4.0 <4.5 14.5 >4.0 Calf Lat Mall 14.0   Right Sural Anti Sensory (Lat Mall)  33.7°C   Calf    3.3 <4.5 14.1 >4.0 Calf Lat Mall 14.0   Site 2    3.2  14.8       Site 3    3.3  14.8         Motor Summary Table     Stim Site NR Onset (ms) Norm Onset (ms) O-P Amp (mV) Norm O-P Amp Amp (Prev) (%) Site1 Site2 Dist (cm) Jason (m/s) Norm Jason (m/s)   Left Femoral Motor (Vastus Med)  25.9°C   Abv Ing Lig    2.4  8.6  100.0        Right Femoral Motor (Vastus Med)  30.9°C   Abv Ing Lig    4.5  0.7  100.0        Below Ing Lig    0.9  0.4  57.1        Left Fibular Motor (Ext Dig Brev)  31.1°C   Ankle    4.5 <6.5 9.0 >2.6 100.0 Ankle Ext Dig Brev 8.0     B Fib    11.4  8.5  94.4 B Fib Ankle 31.0 45 >38   Poplt    13.3  8.4  98.8 Poplt B Fib 10.0 53 >38   Right Fibular Motor (Ext Dig Brev)  32.5°C   Ankle    3.9 <6.5 7.1 >2.6 100.0 Ankle Ext Dig Brev 8.0     B Fib    10.4  6.7  94.4 B Fib Ankle 29.0 45 >38   Poplt    12.1  6.6  98.5 Poplt B Fib 10.0 59 >38   Left Tibial Motor (Abd Marrero Brev)  30.9°C   Ankle    4.1 <6.1 15.0 >5.8 100.0 Ankle Abd Marrero Brev 8.0     Knee    11.1  11.4  76.0 Knee Ankle 32.0 46 >44   Right Tibial Motor (Abd Marrero Brev)  32°C   Ankle    4.1 <6.1 11.5 >5.8 100.0 Ankle Abd Marrero Brev 8.0     Knee    12.1  9.9  86.1 Knee Ankle 35.0 44 >44     F Wave Studies     NR F-Lat (ms) Lat Norm (ms) L-R F-Lat (ms) L-R Lat Norm   Left Tibial (Mrkrs) (Abd Hallucis)  30.6°C      48.20 <56 0.00 <5.7   Right Tibial (Mrkrs) (Abd Hallucis)  31.8°C      48.20 <56 0.00 <5.7     H Reflex Studies     NR H-Lat (ms) L-R H-Lat (ms) L-R Lat Norm   Left Tibial (Gastroc)  31°C      34.93 1.73 <2.0   Right Tibial (Gastroc)  31.7°C      36.67 1.73 <2.0     EMG     Side Muscle Nerve Root Ins Act Fibs Psw Recrt Duration Amp Poly Comment   Right Ext Dig Brev Dp Br Peron L5, S1 Nml 1+ 1+ Mod Nml Nml Nml    Right AbdHallucis MedPlantar S1-2 Nml Nml Nml Mod Nml Nml Nml    Right AntTibialis Dp Br Peron L4-5 Nml Nml Nml Mod Nml Nml Nml    Right MedGastroc Tibial S1-2 Nml 1+ 1+ Mod Nml Nml Nml    Right PostTibialis Tibial L5, S1 Nml Nml Nml Mod Nml Nml Nml    Right VastusLat Femoral L2-4 Nml 1+ 1+ Reduced Nml Nml Nml    Right RectFemoris Femoral L2-4 Nml 1+ 1+ Reduced Nml Nml Nml    Right BicepsFemS Sciatic L5-S1 Nml 1+ 1+ Reduced Nml Nml Nml    Right Semitendinosus Sciatic L5-S2 Nml Nml Nml Reduced Nml Nml Nml    Right GluteusMed SupGluteal L4-S1 Nml 1+ Nml No MUAP Nml Nml Nml    Right GluteusMax InfGluteal L5-S2 Nml 1+ 1+ No MUAP Nml Nml Nml Right Lower Lumb Parasp Rami L5,S1 Nml Nml Nml Nml Nml Nml Nml                Nerve Conduction Studies  Anti Sensory Left/Right Comparison     Stim Site L Lat (ms) R Lat (ms) L-R Lat (ms) L Amp (µV) R Amp (µV) L-R Amp (%) Site1 Site2 L Jason (m/s) R Jason (m/s) L-R Jason (m/s)   Sup Fibular Anti Sensory (Lat ankle)  31.2°C   Lower leg 2.2 1.9 0.3 7.4 29.0 74.5 Lower leg Lat ankle 45 53 8   Sural Anti Sensory (Lat Mall)  29.7°C   Calf 3.0 2.8 0.2 14.5 14.1 2.8 Calf Lat Mall 47 50 3     Motor Left/Right Comparison     Stim Site L Lat (ms) R Lat (ms) L-R Lat (ms) L Amp (mV) R Amp (mV) L-R Amp (%) Site1 Site2 L Jason (m/s) R Jason (m/s) L-R Jason (m/s)   Femoral Motor (Vastus Med)  25.9°C   Abv Ing Lig 2.4 4.5 2.1 8.6 0.7 91.9        Fibular Motor (Ext Dig Brev)  31.1°C   Ankle 4.5 3.9 0.6 9.0 7.1 21.1 Ankle Ext Dig Brev      B Fib 11.4 10.4 1.0 8.5 6.7 21.2 B Fib Ankle 45 45 0   Poplt 13.3 12.1 1.2 8.4 6.6 21.4 Poplt B Fib 53 59 6   Tibial Motor (Abd Marrero Brev)  30.9°C   Ankle 4.1 4.1 0.0 15.0 11.5 23.3 Ankle Abd Marrero Brev      Knee 11.1 12.1 1.0 11.4 9.9 13.2 Knee Ankle 46 44 2         Waveforms:

## 2018-03-12 ENCOUNTER — TELEPHONE (OUTPATIENT)
Dept: NEUROLOGY | Age: 23
End: 2018-03-12

## 2018-06-05 ENCOUNTER — OFFICE VISIT (OUTPATIENT)
Dept: NEUROLOGY | Age: 23
End: 2018-06-05

## 2018-06-05 VITALS
OXYGEN SATURATION: 96 % | RESPIRATION RATE: 16 BRPM | DIASTOLIC BLOOD PRESSURE: 64 MMHG | HEART RATE: 91 BPM | HEIGHT: 68 IN | BODY MASS INDEX: 32.13 KG/M2 | WEIGHT: 212 LBS | SYSTOLIC BLOOD PRESSURE: 126 MMHG

## 2018-06-05 DIAGNOSIS — G54.1 LUMBOSACRAL PLEXOPATHY: Primary | ICD-10-CM

## 2018-06-05 NOTE — PROGRESS NOTES
Neurology Progress Note    Patient ID:  Kallie Azul  [id number]  60 y.o.  1995    HISTORY PROVIDED BY:  Patient      Chief Complaint: Right leg weakness  Subjective:    Mr. Christina Yanes is here for follow up today of right leg weakness. Since his last visit he did have an EMG/NCS. This showed a lumbar plexopathy. 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 Since that time patient to do physical therapy. He had significant improvement. He has had no relapses in terms of drug use. He is living alone. He is attending Narcotics Anonymous meetings. His strength is much better in the right leg. He can almost walk without a limp. He can't run without falling. He has some issues with walking up steps. He did complete PT at [facility]. He did this for about 1 month and then stopped. He will get ocassional needle like sensation in the back of his right thigh. This has improved overall. He denies having any significant pain or need for neuropathic pain medication. Objective:   ROS:  Per HPI-  Otherwise 12 point ROS was negative    Meds:  Current Outpatient Prescriptions on File Prior to Visit   Medication Sig Dispense Refill    OLANZapine (ZYPREXA) 20 mg tablet Take 20 mg by mouth nightly.  buPROPion XL (WELLBUTRIN XL) 300 mg XL tablet Take 300 mg by mouth every morning.  OLANZapine (ZYPREXA) 20 mg tablet Take 20 mg by mouth nightly. No current facility-administered medications on file prior to visit.         Imaging:  No new imaging    EMG/NCS;  Impression:     ABNORMAL     Extensive electrodiagnostic examination of the right lower extremity, with additional nerve conduction study of the left lower extremity, shows findings consistent with a patchy subacute right lumbosacral plexopathy, affecting the proximal muscles more than the distal muscles of the right lower extremity.     Consider repeating electrodiagnostic testing in 3 months if no clinical improvement is noted.     Reviewed records in [facility] and [facility] tab today    Lab Review   Results for orders placed or performed during the hospital encounter of 08/21/17   GLUCOSE, FASTING   Result Value Ref Range    Glucose 95 65 - 100 MG/DL   TSH 3RD GENERATION   Result Value Ref Range    TSH 1.50 0.36 - 3.74 uIU/mL   LIPID PANEL   Result Value Ref Range    LIPID PROFILE          Cholesterol, total 121 <200 MG/DL    Triglyceride 100 <150 MG/DL    HDL Cholesterol 34 MG/DL    LDL, calculated 67 0 - 100 MG/DL    VLDL, calculated 20 MG/DL    CHOL/HDL Ratio 3.6 0 - 5.0         Exam:  Visit Vitals    /64    Pulse 91    Resp 16    Ht 5' 8\" (1.727 m)    Wt 96.2 kg (212 lb)    SpO2 96%    BMI 32.23 kg/m2     Gen: Well developed  CV: RRR  Lungs: non labored breathing  Abd: non distending  Neuro: A&O x 3, no dysarthria or aphasia  CN II-XII: PERRL, EOMI, face symmetric, tongue/palate midline  Motor: strength 5/5 in bilateral upper extremities and left lower extremity, 4/5 proximal right lower extremity, 5/5 distal lower extremity  Sensory: intact to LT but decreased pinprick back of thigh on the right  Gait: Slightly hemiparetic gait    Assessment:   Stefan Larson is a 26 y.o. male who presents for follow up of right leg weakness. EMG did confirm a lumbar plexopathy. This is affecting his proximal muscles more than distal.  His strength has improved but he still has some sensory issues. These are not painful he does not want to try any medication for this at this time. He is already had an MRI of the lumbar spine done previously that was normal.  Since he is showing clinical improvement will hold off on MRI of the lumbar plexus. Patient's plexopathy occurred secondary to positioning/compression. Plan:     1. MRI lumbar spine normal per patient. Will get this record  2. EMG/NCS of bilateral lower extremities with patchy subacute right lumbar plexopathy  3. Completed physical therapy.   Stressed with patient the importance of doing home exercises to continue to improve his strength  4. Patient is back to work full-time  5. No neuropathic pain medication needed at this time  6. Encouraged patient to continue to follow psychiatry as he is doing    FU 6 months    Signed:  Sophie Aguilar MD  6/5/2018  Medications and side effects discussed with patient in detail. With any new medications prescribed, patient was given instructions on administration and side effects. Written medication information was provided to the patient as well. This note was created using voice recognition software. Despite editing, there may be syntax errors. This note will not be viewable in [address]

## 2018-06-05 NOTE — LETTER
Neurology Progress Note Patient ID: Liban Mc 
[id number] 
29 y.o. 
1995 HISTORY PROVIDED BY: 
Patient Chief Complaint: Right leg weakness Subjective:  
 Mr. Dana Weeks is here for follow up today of right leg weakness. Since his last visit he did have an EMG/NCS. This showed a lumbar plexopathy. 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 Since that time patient to do physical therapy. He had significant improvement. He has had no relapses in terms of drug use. He is living alone. He is attending Narcotics Anonymous meetings. His strength is much better in the right leg. He can almost walk without a limp. He can't run without falling. He has some issues with walking up steps. He did complete PT at [facility]. He did this for about 1 month and then stopped. He will get ocassional needle like sensation in the back of his right thigh. This has improved overall. He denies having any significant pain or need for neuropathic pain medication. Objective:  
ROS: 
Per HPI- 
Otherwise 12 point ROS was negative Meds: 
Current Outpatient Prescriptions on File Prior to Visit Medication Sig Dispense Refill  OLANZapine (ZYPREXA) 20 mg tablet Take 20 mg by mouth nightly.  buPROPion XL (WELLBUTRIN XL) 300 mg XL tablet Take 300 mg by mouth every morning.  OLANZapine (ZYPREXA) 20 mg tablet Take 20 mg by mouth nightly. No current facility-administered medications on file prior to visit. Imaging: No new imaging EMG/NCS; 
Impression: 
  
ABNORMAL 
  
Extensive electrodiagnostic examination of the right lower extremity, with additional nerve conduction study of the left lower extremity, shows findings consistent with a patchy subacute right lumbosacral plexopathy, affecting the proximal muscles more than the distal muscles of the right lower extremity. 
  
Consider repeating electrodiagnostic testing in 3 months if no clinical improvement is noted. 
  
Reviewed records in [facility] and [employer] tab today Lab Review Results for orders placed or performed during the hospital encounter of 08/21/17 GLUCOSE, FASTING Result Value Ref Range Glucose 95 65 - 100 MG/DL  
TSH 3RD GENERATION Result Value Ref Range TSH 1.50 0.36 - 3.74 uIU/mL  
LIPID PANEL Result Value Ref Range LIPID PROFILE Cholesterol, total 121 <200 MG/DL Triglyceride 100 <150 MG/DL  
 HDL Cholesterol 34 MG/DL  
 LDL, calculated 67 0 - 100 MG/DL VLDL, calculated 20 MG/DL  
 CHOL/HDL Ratio 3.6 0 - 5.0 Exam: 
Visit Vitals  /64  Pulse 91  Resp 16  
 Ht 5' 8\" (1.727 m)  Wt 96.2 kg (212 lb)  SpO2 96%  BMI 32.23 kg/m2 Gen: Well developed CV: RRR Lungs: non labored breathing Abd: non distending Neuro: A&O x 3, no dysarthria or aphasia CN II-XII: PERRL, EOMI, face symmetric, tongue/palate midline Motor: strength 5/5 in bilateral upper extremities and left lower extremity, 4/5 proximal right lower extremity, 5/5 distal lower extremity Sensory: intact to LT but decreased pinprick back of thigh on the right Gait: Slightly hemiparetic gait Assessment:  
Mariah Payton is a 26 y.o. male who presents for follow up of right leg weakness. EMG did confirm a lumbar plexopathy. This is affecting his proximal muscles more than distal.  His strength has improved but he still has some sensory issues. These are not painful he does not want to try any medication for this at this time. He is already had an MRI of the lumbar spine done previously that was normal.  Since he is showing clinical improvement will hold off on MRI of the lumbar plexus. Patient's plexopathy occurred secondary to positioning/compression. Plan: 1. MRI lumbar spine normal per patient. Will get this record 2. EMG/NCS of bilateral lower extremities with patchy subacute right lumbar plexopathy 3.  Completed physical therapy. Stressed with patient the importance of doing home exercises to continue to improve his strength 4. Patient is back to work full-time 5. No neuropathic pain medication needed at this time 6. Encouraged patient to continue to follow psychiatry as he is doing FU 6 months Signed: 
Stacie De Leon MD 
6/5/2018 Medications and side effects discussed with patient in detail. With any new medications prescribed, patient was given instructions on administration and side effects. Written medication information was provided to the patient as well. This note was created using voice recognition software. Despite editing, there may be syntax errors. This note will not be viewable in [address] Chief Complaint Patient presents with  Numbness  
  lower back and R thigh 1. Have you been to the ER, urgent care clinic since your last visit? Hospitalized since your last visit? No 
 
2. Have you seen or consulted any other health care providers outside of the [facility] since your last visit? Include any pap smears or colon screening. No  
 
Visit Vitals  /64  Pulse 91  Resp 16  
 Ht 5' 8\" (1.727 m)  Wt 96.2 kg (212 lb)  SpO2 96%  BMI 32.23 kg/m2

## 2018-06-05 NOTE — PROGRESS NOTES
Chief Complaint   Patient presents with    Numbness     lower back and R thigh     1. Have you been to the ER, urgent care clinic since your last visit? Hospitalized since your last visit? No    2. Have you seen or consulted any other health care providers outside of the [facility] since your last visit? Include any pap smears or colon screening.  No     Visit Vitals    /64    Pulse 91    Resp 16    Ht 5' 8\" (1.727 m)    Wt 96.2 kg (212 lb)    SpO2 96%    BMI 32.23 kg/m2

## 2018-06-05 NOTE — PATIENT INSTRUCTIONS
[facility]   Statement to Patients  April 1, 2014      In an effort to ensure the large volume of patient prescription refills is processed in the most efficient and expeditious manner, we are asking our patients to assist us by calling your Pharmacy for all prescription refills, this will include also your  Mail Order Pharmacy. The pharmacy will contact our office electronically to continue the refill process. Please do not wait until the last minute to call your pharmacy. We need at least 48 hours (2days) to fill prescriptions. We also encourage you to call your pharmacy before going to  your prescription to make sure it is ready. With regard to controlled substance prescription refill requests (narcotic refills) that need to be picked up at our office, we ask your cooperation by providing us with at least 72 hours (3days) notice that you will need a refill. We will not refill narcotic prescription refill requests after 4:00pm on any weekday, Monday through Thursday, or after 2:00pm on Fridays, or on the weekends. We encourage everyone to explore another way of getting your prescription refill request processed using [email or web address], our patient web portal through our electronic medical record system. [email or web address] is an efficient and effective way to communicate your medication request directly to the office and  downloadable as an sharri on your smart phone . [email or web address] also features a review functionality that allows you to view your medication list as well as leave messages for your physician. Are you ready to get connected? If so please review the attatched instructions or speak to any of our staff to get you set up right away! Thank you so much for your cooperation. Should you have any questions please contact our Practice Administrator.     The Physicians and Staff,  [facility]   Patient Instruction Plan/ Result Policy    If we have ordered testing for you, know that; \"NO NEWS IS GOOD NEWS! \" It is our policy that we know longer call patients with results, nor do we  give test results over the phone. We schedule follow up appointments so that your results can be discussed in person. This allows you to address any questions you have regarding the results. If something of concern is revealed on your test, we will contact you to discuss the matter and if needed schedule a sooner follow up appointment. Additionally, results may be found by using the My Chart feature and one of our patient service representatives at the  can give you instructions on how to access this feature to utilize our electronic medical record system. Thank you for your understanding. Learning About Constantino Leon  What is a living will? A living will is a legal form you use to write down the kind of care you want at the end of your life. It is used by the health professionals who will treat you if you aren't able to decide for yourself. If you put your wishes in writing, your loved ones and others will know what kind of care you want. They won't need to guess. This can ease your mind and be helpful to others. A living will is not the same as an estate or property will. An estate will explains what you want to happen with your money and property after you die. Is a living will a legal document? A living will is a legal document. Each state has its own laws about living bryan. If you move to another state, make sure that your living will is legal in the state where you now live. Or you might use a universal form that has been approved by many states. This kind of form can sometimes be completed and stored online. Your electronic copy will then be available wherever you have a connection to the Internet. In most cases, doctors will respect your wishes even if you have a form from a different state.   · You don't need an  to complete a living will. But legal advice can be helpful if your state's laws are unclear, your health history is complicated, or your family can't agree on what should be in your living will. · You can change your living will at any time. Some people find that their wishes about end-of-life care change as their health changes. · In addition to making a living will, think about completing a medical power of  form. This form lets you name the person you want to make end-of-life treatment decisions for you (your \"health care agent\") if you're not able to. Many hospitals and nursing homes will give you the forms you need to complete a living will and a medical power of . · Your living will is used only if you can't make or communicate decisions for yourself anymore. If you become able to make decisions again, you can accept or refuse any treatment, no matter what you wrote in your living will. · Your state may offer an online registry. This is a place where you can store your living will online so the doctors and nurses who need to treat you can find it right away. What should you think about when creating a living will? Talk about your end-of-life wishes with your family members and your doctor. Let them know what you want. That way the people making decisions for you won't be surprised by your choices. Think about these questions as you make your living will:  · Do you know enough about life support methods that might be used? If not, talk to your doctor so you know what might be done if you can't breathe on your own, your heart stops, or you're unable to swallow. · What things would you still want to be able to do after you receive life-support methods? Would you want to be able to walk? To speak? To eat on your own? To live without the help of machines? · If you have a choice, where do you want to be cared for? In your home? At a hospital or nursing home?   · Do you want certain Latter day practices performed if you become very ill? · If you have a choice at the end of your life, where would you prefer to die? At home? In a hospital or nursing home? Somewhere else? · Would you prefer to be buried or cremated? · Do you want your organs to be donated after you die? What should you do with your living will? · Make sure that your family members and your health care agent have copies of your living will. · Give your doctor a copy of your living will to keep in your medical record. If you have more than one doctor, make sure that each one has a copy. · You may want to put a copy of your living will where it can be easily found. Where can you learn more? Go to http://fiona-pedro.info/. Enter Q620 in the search box to learn more about \"Learning About Living Bang. \"  Current as of: September 24, 2016  Content Version: 11.4  © 7940-6699 [employer]. Care instructions adapted under license by [employer] (which disclaims liability or warranty for this information). If you have questions about a medical condition or this instruction, always ask your healthcare professional. [place] [address] any warranty or liability for your use of this information. Advance Directives: Care Instructions  Your Care Instructions  An advance directive is a legal way to state your wishes at the end of your life. It tells your family and your doctor what to do if you can no longer say what you want. There are two main types of advance directives. You can change them any time that your wishes change. · A living will tells your family and your doctor your wishes about life support and other treatment. · A durable power of  for health care lets you name a person to make treatment decisions for you when you can't speak for yourself. This person is called a health care agent.   If you do not have an advance directive, decisions about your medical care may be made by a doctor or a  who doesn't know you. It may help to think of an advance directive as a gift to the people who care for you. If you have one, they won't have to make tough decisions by themselves. Follow-up care is a key part of your treatment and safety. Be sure to make and go to all appointments, and call your doctor if you are having problems. It's also a good idea to know your test results and keep a list of the medicines you take. How can you care for yourself at home? · Discuss your wishes with your loved ones and your doctor. This way, there are no surprises. · Many states have a unique form. Or you might use a universal form that has been approved by many states. This kind of form can sometimes be completed and stored online. Your electronic copy will then be available wherever you have a connection to the Internet. In most cases, doctors will respect your wishes even if you have a form from a different state. · You don't need a  to do an advance directive. But you may want to get legal advice. · Think about these questions when you prepare an advance directive:  ¨ Who do you want to make decisions about your medical care if you are not able to? Many people choose a family member or close friend. ¨ Do you know enough about life support methods that might be used? If not, talk to your doctor so you understand. ¨ What are you most afraid of that might happen? You might be afraid of having pain, losing your independence, or being kept alive by machines. ¨ Where would you prefer to die? Choices include your home, a hospital, or a nursing home. ¨ Would you like to have information about hospice care to support you and your family? ¨ Do you want to donate organs when you die? ¨ Do you want certain Yazdanism practices performed before you die? If so, put your wishes in the advance directive. · Read your advance directive every year, and make changes as needed.   When should you call for help?  Be sure to contact your doctor if you have any questions. Where can you learn more? Go to http://fiona-pedro.info/. Enter R264 in the search box to learn more about \"Advance Directives: Care Instructions. \"  Current as of: September 24, 2016  Content Version: 11.4  © 3434-1708 [employer]. Care instructions adapted under license by [employer] (which disclaims liability or warranty for this information). If you have questions about a medical condition or this instruction, always ask your healthcare professional. [address] any warranty or liability for your use of this information.

## 2018-06-05 NOTE — MR AVS SNAPSHOT
[address] [place] [address] [address] [facility] [id number] [phone] Patient: Angel Mckinnon MRN: [id number] :[id number] Visit Information Date & Time Provider Department Dept. Phone Encounter #  
 2018  1:20 PM [facility] [phone] [id number] Follow-up Instructions Return in about 6 months (around 2018). Upcoming Health Maintenance Date Due DTaP/Tdap/Td series (1 - Tdap) 3/13/2017 Influenza Age 5 to Adult 2018 Allergies as of 2018  Review Complete On: 2018 By: Tresa Fitzpatrick MD  
  
 Severity Noted Reaction Type Reactions Penicillin G  2017    Hives Current Immunizations  Never Reviewed No immunizations on file. Not reviewed this visit Vitals BP Pulse Resp Height(growth percentile) Weight(growth percentile) SpO2  
 126/64 91 16 5' 8\" (1.727 m) 212 lb (96.2 kg) 96% BMI Smoking Status 32.23 kg/m2 Never Assessed BMI and BSA Data Body Mass Index Body Surface Area  
 32.23 kg/m 2 2.15 m 2 Your Updated Medication List  
  
   
This list is accurate as of 18  1:59 PM.  Always use your most recent med list.  
  
  
  
  
 WELLBUTRIN  mg XL tablet Generic drug:  buPROPion XL Take 300 mg by mouth every morning. * ZyPREXA 20 mg tablet Generic drug:  OLANZapine Take 20 mg by mouth nightly. * ZyPREXA 20 mg tablet Generic drug:  OLANZapine Take 20 mg by mouth nightly. * Notice: This list has 2 medication(s) that are the same as other medications prescribed for you. Read the directions carefully, and ask your doctor or other care provider to review them with you. Follow-up Instructions Return in about 6 months (around 2018). Patient Instructions PRESCRIPTION REFILL POLICY [facility] Statement to Patients April 1, 2014 In an effort to ensure the large volume of patient prescription refills is processed in the most efficient and expeditious manner, we are asking our patients to assist us by calling your Pharmacy for all prescription refills, this will include also your  Mail Order Pharmacy. The pharmacy will contact our office electronically to continue the refill process. Please do not wait until the last minute to call your pharmacy. We need at least 48 hours (2days) to fill prescriptions. We also encourage you to call your pharmacy before going to  your prescription to make sure it is ready. With regard to controlled substance prescription refill requests (narcotic refills) that need to be picked up at our office, we ask your cooperation by providing us with at least 72 hours (3days) notice that you will need a refill. We will not refill narcotic prescription refill requests after 4:00pm on any weekday, Monday through Thursday, or after 2:00pm on Fridays, or on the weekends. We encourage everyone to explore another way of getting your prescription refill request processed using Pic[employer], our patient web portal through our electronic medical record system. Pic[employer]ico is an efficient and effective way to communicate your medication request directly to the office and  downloadable as an sharri on your smart phone . [employer]ovico also features a review functionality that allows you to view your medication list as well as leave messages for your physician. Are you ready to get connected? If so please review the attatched instructions or speak to any of our staff to get you set up right away! Thank you so much for your cooperation. Should you have any questions please contact our Practice Administrator. The Physicians and Staff,  [facility] Patient Instruction Plan/ Result Policy If we have ordered testing for you, know that; \"NO NEWS IS GOOD NEWS! \" It is our policy that we know longer call patients with results, nor do we  give test results over the phone. We schedule follow up appointments so that your results can be discussed in person. This allows you to address any questions you have regarding the results. If something of concern is revealed on your test, we will contact you to discuss the matter and if needed schedule a sooner follow up appointment. Additionally, results may be found by using the My Chart feature and one of our patient service representatives at the  can give you instructions on how to access this feature to utilize our electronic medical record system. Thank you for your understanding. Waqas Silvestre 1721 What is a living will? A living will is a legal form you use to write down the kind of care you want at the end of your life. It is used by the health professionals who will treat you if you aren't able to decide for yourself. If you put your wishes in writing, your loved ones and others will know what kind of care you want. They won't need to guess. This can ease your mind and be helpful to others. A living will is not the same as an estate or property will. An estate will explains what you want to happen with your money and property after you die. Is a living will a legal document? A living will is a legal document. Each state has its own laws about living bryan. If you move to another state, make sure that your living will is legal in the state where you now live. Or you might use a universal form that has been approved by many states. This kind of form can sometimes be completed and stored online. Your electronic copy will then be available wherever you have a connection to the Internet. In most cases, doctors will respect your wishes even if you have a form from a different state. · You don't need an  to complete a living will.  But legal advice can be helpful if your state's laws are unclear, your health history is complicated, or your family can't agree on what should be in your living will. · You can change your living will at any time. Some people find that their wishes about end-of-life care change as their health changes. · In addition to making a living will, think about completing a medical power of  form. This form lets you name the person you want to make end-of-life treatment decisions for you (your \"health care agent\") if you're not able to. Many hospitals and nursing homes will give you the forms you need to complete a living will and a medical power of . · Your living will is used only if you can't make or communicate decisions for yourself anymore. If you become able to make decisions again, you can accept or refuse any treatment, no matter what you wrote in your living will. · Your state may offer an online registry. This is a place where you can store your living will online so the doctors and nurses who need to treat you can find it right away. What should you think about when creating a living will? Talk about your end-of-life wishes with your family members and your doctor. Let them know what you want. That way the people making decisions for you won't be surprised by your choices. Think about these questions as you make your living will: · Do you know enough about life support methods that might be used? If not, talk to your doctor so you know what might be done if you can't breathe on your own, your heart stops, or you're unable to swallow. · What things would you still want to be able to do after you receive life-support methods? Would you want to be able to walk? To speak? To eat on your own? To live without the help of machines? · If you have a choice, where do you want to be cared for? In your home? At a hospital or nursing home? · Do you want certain Catholic practices performed if you become very ill? · If you have a choice at the end of your life, where would you prefer to die? At home? In a hospital or nursing home? Somewhere else? · Would you prefer to be buried or cremated? · Do you want your organs to be donated after you die? What should you do with your living will? · Make sure that your family members and your health care agent have copies of your living will. · Give your doctor a copy of your living will to keep in your medical record. If you have more than one doctor, make sure that each one has a copy. · You may want to put a copy of your living will where it can be easily found. Where can you learn more? Go to http://fiona-pedro.info/. Enter B608 in the search box to learn more about \"Learning About Living Garrett Leon. \" Current as of: September 24, 2016 Content Version: 11.4 © 2207-6609 [employer]. Care instructions adapted under license by [employer] (which disclaims liability or warranty for this information). If you have questions about a medical condition or this instruction, always ask your healthcare professional. [place] [address] any warranty or liability for your use of this information. Advance Directives: Care Instructions Your Care Instructions An advance directive is a legal way to state your wishes at the end of your life. It tells your family and your doctor what to do if you can no longer say what you want. There are two main types of advance directives. You can change them any time that your wishes change. · A living will tells your family and your doctor your wishes about life support and other treatment. · A durable power of  for health care lets you name a person to make treatment decisions for you when you can't speak for yourself. This person is called a health care agent.  
If you do not have an advance directive, decisions about your medical care may be made by a doctor or a  who doesn't know you. It may help to think of an advance directive as a gift to the people who care for you. If you have one, they won't have to make tough decisions by themselves. Follow-up care is a key part of your treatment and safety. Be sure to make and go to all appointments, and call your doctor if you are having problems. It's also a good idea to know your test results and keep a list of the medicines you take. How can you care for yourself at home? · Discuss your wishes with your loved ones and your doctor. This way, there are no surprises. · Many states have a unique form. Or you might use a universal form that has been approved by many states. This kind of form can sometimes be completed and stored online. Your electronic copy will then be available wherever you have a connection to the Internet. In most cases, doctors will respect your wishes even if you have a form from a different state. · You don't need a  to do an advance directive. But you may want to get legal advice. · Think about these questions when you prepare an advance directive: ¨ Who do you want to make decisions about your medical care if you are not able to? Many people choose a family member or close friend. ¨ Do you know enough about life support methods that might be used? If not, talk to your doctor so you understand. ¨ What are you most afraid of that might happen? You might be afraid of having pain, losing your independence, or being kept alive by machines. ¨ Where would you prefer to die? Choices include your home, a hospital, or a nursing home. ¨ Would you like to have information about hospice care to support you and your family? ¨ Do you want to donate organs when you die? ¨ Do you want certain Bahai practices performed before you die? If so, put your wishes in the advance directive. · Read your advance directive every year, and make changes as needed. When should you call for help? Be sure to contact your doctor if you have any questions. Where can you learn more? Go to http://fiona-pedro.info/. Enter R264 in the search box to learn more about \"Advance Directives: Care Instructions. \" Current as of: September 24, 2016 Content Version: 11.4 © 8184-7549 [employer]. Care instructions adapted under license by [employer] (which disclaims liability or warranty for this information). If you have questions about a medical condition or this instruction, always ask your healthcare professional. [address] any warranty or liability for your use of this information. Introducing [facility]! [facility] introduces [email or web address] patient portal. Now you can access parts of your medical record, email your doctor's office, and request medication refills online. 1. In your internet browser, go to [email or web address]://Pro [email or web address]. [email or web address] 3 Games 2. Click on the First Time User? Click Here link in the Sign In box. You will see the New Member Sign Up page. 3. Enter your [email or web address] Access Code exactly as it appears below. You will not need to use this code after youve completed the sign-up process. If you do not sign up before the expiration date, you must request a new code. · [email or web address] Access Code: [email or web address] Expires: 9/3/2018  1:55 PM 
 
4. Enter the last four digits of your Social Security Number (xxxx) and Date of Birth (mm/dd/yyyy) as indicated and click Submit. You will be taken to the next sign-up page. 5. Create a Genometryt ID. This will be your [email or web address] login ID and cannot be changed, so think of one that is secure and easy to remember. 6. Create a [email or web address] password. You can change your password at any time. 7. Enter your Password Reset Question and Answer. This can be used at a later time if you forget your password. 8. Enter your e-mail address.  You will receive e-mail notification when new information is available in [employer]. 9. Click Sign Up. You can now view and download portions of your medical record. 10. Click the Download Summary menu link to download a portable copy of your medical information. If you have questions, please visit the Frequently Asked Questions section of the [employer] website. Remember, [employer] is NOT to be used for urgent needs. For medical emergencies, dial 911. Now available from your iPhone and Android! Please provide this summary of care documentation to your next provider. Your primary care clinician is listed as Estrellita Shelton. If you have any questions after today's visit, please call [phone].

## 2020-05-21 NOTE — ED NOTES
[facility] arrived to transport the patient to [facility] [place]. [facility] in possession of patient chart (including EMTALA) and his belongings at time of transfer. No acute distress noted at time of transfer, vital signs are stable. Warm/Dry

## 2023-01-26 NOTE — BH NOTES
GROUP THERAPY PROGRESS NOTE    The patient Angi Fatima is participating in Reflection Group. Group time: 30 minutes    Personal goal for participation:  To reflect on today's occurences    Goal orientation: Reflection    Group therapy participation: Active    Therapeutic interventions reviewed and discussed: Yes    Dayo Ramirez  8/23/2017 Yes